# Patient Record
Sex: FEMALE | Race: WHITE | NOT HISPANIC OR LATINO | Employment: UNEMPLOYED | URBAN - METROPOLITAN AREA
[De-identification: names, ages, dates, MRNs, and addresses within clinical notes are randomized per-mention and may not be internally consistent; named-entity substitution may affect disease eponyms.]

---

## 2017-01-23 ENCOUNTER — HOSPITAL ENCOUNTER (OUTPATIENT)
Facility: HOSPITAL | Age: 62
Discharge: HOME | End: 2017-01-24
Attending: SURGERY | Admitting: INTERNAL MEDICINE
Payer: COMMERCIAL

## 2017-01-23 DIAGNOSIS — R41.3 MEMORY LOSS: ICD-10-CM

## 2017-01-23 DIAGNOSIS — G45.4 TRANSIENT GLOBAL AMNESIA: ICD-10-CM

## 2017-01-23 DIAGNOSIS — G45.9 TRANSIENT CEREBRAL ISCHEMIC ATTACK: ICD-10-CM

## 2017-01-23 DIAGNOSIS — R20.0 ARM NUMBNESS LEFT: Primary | ICD-10-CM

## 2017-01-23 LAB
25(OH)D3+25(OH)D2 SERPL-MCNC: 24 NG/ML
ALBUMIN SERPL BCP-MCNC: 4.3 G/DL
ALP SERPL-CCNC: 72 U/L
ALT SERPL W/O P-5'-P-CCNC: 16 U/L
ANION GAP SERPL CALC-SCNC: 10 MMOL/L
APTT BLDCRRT: 24.1 SEC
AST SERPL-CCNC: 16 U/L
BASOPHILS # BLD AUTO: 0.01 K/UL
BASOPHILS NFR BLD: 0.2 %
BILIRUB SERPL-MCNC: 1.1 MG/DL
BNP SERPL-MCNC: 76 PG/ML
BUN SERPL-MCNC: 12 MG/DL
CALCIUM SERPL-MCNC: 9.3 MG/DL
CHLORIDE SERPL-SCNC: 104 MMOL/L
CK MB SERPL-MCNC: 1 NG/ML
CK MB SERPL-RTO: 1.2 %
CK SERPL-CCNC: 85 U/L
CK SERPL-CCNC: 85 U/L
CO2 SERPL-SCNC: 26 MMOL/L
CREAT SERPL-MCNC: 0.8 MG/DL
DIFFERENTIAL METHOD: NORMAL
EOSINOPHIL # BLD AUTO: 0.1 K/UL
EOSINOPHIL NFR BLD: 1.6 %
ERYTHROCYTE [DISTWIDTH] IN BLOOD BY AUTOMATED COUNT: 13.4 %
EST. GFR  (AFRICAN AMERICAN): >60 ML/MIN/1.73 M^2
EST. GFR  (NON AFRICAN AMERICAN): >60 ML/MIN/1.73 M^2
FOLATE SERPL-MCNC: 13.8 NG/ML
GLUCOSE SERPL-MCNC: 118 MG/DL
HCT VFR BLD AUTO: 43.9 %
HGB BLD-MCNC: 15 G/DL
INR PPP: 1
LYMPHOCYTES # BLD AUTO: 1.9 K/UL
LYMPHOCYTES NFR BLD: 33 %
MAGNESIUM SERPL-MCNC: 2.3 MG/DL
MCH RBC QN AUTO: 29 PG
MCHC RBC AUTO-ENTMCNC: 34.2 %
MCV RBC AUTO: 85 FL
MONOCYTES # BLD AUTO: 0.4 K/UL
MONOCYTES NFR BLD: 6.4 %
NEUTROPHILS # BLD AUTO: 3.3 K/UL
NEUTROPHILS NFR BLD: 58.8 %
PHOSPHATE SERPL-MCNC: 2.7 MG/DL
PLATELET # BLD AUTO: 266 K/UL
PMV BLD AUTO: 9.6 FL
POTASSIUM SERPL-SCNC: 4.4 MMOL/L
PROT SERPL-MCNC: 7.6 G/DL
PROTHROMBIN TIME: 9.8 SEC
RBC # BLD AUTO: 5.18 M/UL
SODIUM SERPL-SCNC: 140 MMOL/L
TROPONIN I SERPL DL<=0.01 NG/ML-MCNC: <0.006 NG/ML
TSH SERPL DL<=0.005 MIU/L-ACNC: 3.31 UIU/ML
VIT B12 SERPL-MCNC: 324 PG/ML
WBC # BLD AUTO: 5.63 K/UL

## 2017-01-23 PROCEDURE — 82607 VITAMIN B-12: CPT

## 2017-01-23 PROCEDURE — 25000003 PHARM REV CODE 250: Performed by: SURGERY

## 2017-01-23 PROCEDURE — 85025 COMPLETE CBC W/AUTO DIFF WBC: CPT

## 2017-01-23 PROCEDURE — A9585 GADOBUTROL INJECTION: HCPCS | Performed by: INTERNAL MEDICINE

## 2017-01-23 PROCEDURE — 80053 COMPREHEN METABOLIC PANEL: CPT

## 2017-01-23 PROCEDURE — 27000339 *HC DAILY SUPPLY KIT

## 2017-01-23 PROCEDURE — 25500020 PHARM REV CODE 255: Performed by: INTERNAL MEDICINE

## 2017-01-23 PROCEDURE — 83735 ASSAY OF MAGNESIUM: CPT

## 2017-01-23 PROCEDURE — 36415 COLL VENOUS BLD VENIPUNCTURE: CPT

## 2017-01-23 PROCEDURE — 99215 OFFICE O/P EST HI 40 MIN: CPT | Mod: ,,, | Performed by: PSYCHIATRY & NEUROLOGY

## 2017-01-23 PROCEDURE — 84100 ASSAY OF PHOSPHORUS: CPT

## 2017-01-23 PROCEDURE — 82746 ASSAY OF FOLIC ACID SERUM: CPT

## 2017-01-23 PROCEDURE — 85730 THROMBOPLASTIN TIME PARTIAL: CPT

## 2017-01-23 PROCEDURE — 99284 EMERGENCY DEPT VISIT MOD MDM: CPT | Mod: 25

## 2017-01-23 PROCEDURE — 85610 PROTHROMBIN TIME: CPT

## 2017-01-23 PROCEDURE — 83880 ASSAY OF NATRIURETIC PEPTIDE: CPT

## 2017-01-23 PROCEDURE — G0378 HOSPITAL OBSERVATION PER HR: HCPCS

## 2017-01-23 PROCEDURE — 84484 ASSAY OF TROPONIN QUANT: CPT | Mod: 91

## 2017-01-23 PROCEDURE — 82553 CREATINE MB FRACTION: CPT

## 2017-01-23 PROCEDURE — 99219 PR INITIAL OBSERVATION CARE,LEVL II: CPT | Mod: ,,, | Performed by: INTERNAL MEDICINE

## 2017-01-23 PROCEDURE — 82306 VITAMIN D 25 HYDROXY: CPT

## 2017-01-23 PROCEDURE — 84443 ASSAY THYROID STIM HORMONE: CPT

## 2017-01-23 PROCEDURE — 27200120 HC KIT IV START (RUSH ONLY)

## 2017-01-23 RX ORDER — ONDANSETRON 2 MG/ML
4 INJECTION INTRAMUSCULAR; INTRAVENOUS EVERY 8 HOURS PRN
Status: DISCONTINUED | OUTPATIENT
Start: 2017-01-23 | End: 2017-01-24 | Stop reason: HOSPADM

## 2017-01-23 RX ORDER — GADOBUTROL 604.72 MG/ML
5 INJECTION INTRAVENOUS
Status: COMPLETED | OUTPATIENT
Start: 2017-01-23 | End: 2017-01-23

## 2017-01-23 RX ORDER — ACETAMINOPHEN 325 MG/1
650 TABLET ORAL EVERY 8 HOURS PRN
Status: DISCONTINUED | OUTPATIENT
Start: 2017-01-23 | End: 2017-01-24 | Stop reason: HOSPADM

## 2017-01-23 RX ORDER — PANTOPRAZOLE SODIUM 40 MG/1
40 TABLET, DELAYED RELEASE ORAL DAILY
Status: DISCONTINUED | OUTPATIENT
Start: 2017-01-23 | End: 2017-01-24 | Stop reason: HOSPADM

## 2017-01-23 RX ADMIN — GADOBUTROL 5 ML: 604.72 INJECTION INTRAVENOUS at 02:01

## 2017-01-23 RX ADMIN — PANTOPRAZOLE SODIUM 40 MG: 40 TABLET, DELAYED RELEASE ORAL at 11:01

## 2017-01-23 NOTE — NURSING
Spoke with patient and family. NP spoke with patient. Patient less anxious now. All questions answered.

## 2017-01-23 NOTE — CONSULTS
"[facility]  Neurology  Consult Note    Patient Name: Tammie Sanders  MRN: [id number]  Admission Date: 1/23/2017  Hospital Length of Stay: 0 days  Code Status: Full Code   Attending Provider: Mariela Ross MD   Consulting Provider: Kendall Steel MD  Primary Care Physician: Ignacia Chan MD  Principal Problem:<principal problem not specified>    Inpatient consult to Neurology  Consult performed by: KENDALL STEEL  Consult ordered by: MICHAEL GRECO         Subjective:     Chief Complaint:  AMS. Left arm numbness     HPI:   Tammie Sanders is a 61 y.o. 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 EMG showed Mild bilateral Carpal tunnel syndrome prior and MRI C spine did not show significant process on the left  She had not come for follow up with me in nearly 2 years  She presents today with episode of confusion and left arm numbness after much stress at home.Grandson was being placed in rehab at the time this was occurring.   Patient was last seen normal at 8 am this am. Then, she was confused as to wear she was and what she was doing very suddenly-- asking, "where am I, what are we doing?" to  when she was completely oriented prior. This lasted no more than 30 seconds and resolved  Her left arm is numb but she contributes this to her known CTS and states this is no worse than prior/ states it remains this way  Symptoms resolved prior to ER presentation.        Past Medical History   Diagnosis Date    Migraine headache        History reviewed. No pertinent past surgical history.    Review of patient's allergies indicates:   Allergen Reactions    No known drug allergies            No current facility-administered medications on file prior to encounter.      Current Outpatient Prescriptions on File Prior to Encounter "   Medication Sig    estradiol-levonorgestrel (CLIMARAPRO) 0.045-0.015 mg/24 hr Place 1 patch onto the skin once a week.    sumatriptan (IMITREX) 100 MG tablet Take one daily PRN headache. May repeat dose once after 2 hours    verapamil (CALAN-SR) 120 MG CR tablet Take 1 tablet (120 mg total) by mouth every evening.     Family History     Problem Relation (Age of Onset)    Cancer Father    Heart disease Mother    Migraines Daughter        Social History Main Topics    Smoking status: Former Smoker     Packs/day: 1.00     Years: 25.00     Types: Cigarettes     Quit date: 1/1/1997    Smokeless tobacco: Never Used    Alcohol use No    Drug use: No    Sexual activity: Yes     Partners: Male     Birth control/ protection: Post-menopausal   I have reviewed all of this patient's past medical and surgical histories as well as family and social histories and active allergies and medications as documented in the electronic medical record.    Review of Systems   Constitutional: Negative for fever.   HENT: Negative for nosebleeds.    Eyes: Negative for photophobia.   Respiratory: Negative for chest tightness.    Cardiovascular: Negative for leg swelling.   Gastrointestinal: Negative for blood in stool.   Genitourinary: Negative for hematuria.   Musculoskeletal: Negative for neck pain.   Neurological: Negative for tremors.   Psychiatric/Behavioral: Negative for behavioral problems.     Objective:     Vital Signs (Most Recent):  Temp: 97.6 °F (36.4 °C) (01/23/17 1103)  Pulse: 93 (01/23/17 1103)  Resp: (!) 24 (01/23/17 1103)  BP: (!) 151/86 (01/23/17 1006)  SpO2: 99 % (01/23/17 1006) Vital Signs (24h Range):  Temp:  [97.6 °F (36.4 °C)-98.2 °F (36.8 °C)] 97.6 °F (36.4 °C)  Pulse:  [76-93] 93  Resp:  [18-24] 24  SpO2:  [98 %-99 %] 99 %  BP: (150-180)/(85-92) 151/86     Weight: 56.7 kg (125 lb)  Body mass index is 24.41 kg/(m^2).    Physical Exam    Gen Appearance, well developed/nourished in no apparent distress  CV: 2+  distal pulses with no edema or swelling  Neuro:  MS: Awake, alert, oriented to place, person, time, situation. Sustains attention. Recent/remote memory intact, Language is full to spontaneous speech/repetition/naming/comprehension. Fund of Knowledge is full  CN: Optic discs are flat with normal vasculature, PERRL, Extraoccular movements and visual fields are full. Normal facial sensation and strength, Hearing symmetric, Tongue and Palate are midline and strong. Shoulder Shrug symmetric and strong.  Motor: Normal bulk, tone, no abnormal movements. 5/5 strength bilateral upper/lower extremities with 2+ reflexes and bilateral plantar response  Sensory: symmetric to light touch, pain, temp, and vibration Romberg negative  Cerebellar: Finger-nose,Heal-shin, Rapid alternating movements intact  Gait: Normal stance, no ataxia         Significant Labs:CMP, CBC, reviewed  Troponin normal     Significant Imaging:CT head reports and images reviewed  EKG Pending    Assessment and Plan:   Tammie Sanders is a 61 y.o. 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 Prior EMG showed Mild bilateral Carpal tunnel syndrome prior and MRI C spine did not show significant process on the left  She had not come for follow up with me in nearly 2 years  Here today for 30 minutes of confusion  I recommend:     Arm numbness left  Patient states this is a chronic known complaint (last saw me for this in 2015/ has known CTS    Transient global amnesia  -Differential Diagnosis includes Migraine Aura, Transient global amnesia, and least likely TIA/CVA, Seizure  -MRI of the brain with MRA of the brain both with contrast to rule out vascular cause of symptoms  -Carotid US  -2D echo with bubble study  -Telemetry and Neurochecks every four hours and call me for any changes  -Not a TPA or rehab/therapy candidate-  symptoms resolved and seem not likely vascular  -Check fasting lipids and glucose in the am  -Avoid ASA until MRI better reviewed/ Had pontine lesion prior.   -Check An EEG  If all studies are unremarkable this likely represented either migraine aura or Transient Global Amnesia which is also felt to be a benign condition. Discussed family stress as a risk factor with patient.   -reduce stress to reduce further migraine triggers.     Will follow. Consider D/c home tomorrow if above studies are normal  No restrictions needed otherwise. Follow up with neurology per d/c summary.   Kit Steel MD  Neurology  [facility]

## 2017-01-23 NOTE — IP AVS SNAPSHOT
[address] [address]  Phone: [phone]           Patient Discharge Instructions     Our goal is to set you up for success. This packet includes information on your condition, medications, and your home care. It will help you to care for yourself so you don't get sicker and need to go back to the hospital.     Please ask your nurse if you have any questions.        There are many details to remember when preparing to leave the hospital. Here is what you will need to do:    1. Take your medicine. If you are prescribed medications, review your Medication List in the following pages. You may have new medications to  at the pharmacy and others that you'll need to stop taking. Review the instructions for how and when to take your medications. Talk with your doctor or nurses if you are unsure of what to do.     2. Go to your follow-up appointments. Specific follow-up information is listed in the following pages. Your may be contacted by a transition nurse or clinical provider about future appointments. Be sure we have all of the phone numbers to reach you, if needed. Please contact your provider's office if you are unable to make an appointment.     3. Watch for warning signs. Your doctor or nurse will give you detailed warning signs to watch for and when to call for assistance. These instructions may also include educational information about your condition. If you experience any of warning signs to your health, call your doctor.               [facility]  Unless otherwise directed by your provider, please contact [facility], our nurse care line that is available for 24/7 assistance.     [phone] (toll-free)    Registered nurses in the [facility] provide clinical advisement, health education, appointment booking, and other advisory services.                    ** Verify the list of medication(s) below is accurate and up to date. Carry  this with you in case of emergency. If your medications have changed, please notify your healthcare provider.             Medication List      CONTINUE taking these medications        Additional Info                      estradiol-levonorgestrel 0.045-0.015 mg/24 hr   Commonly known as:  CLIMARAPRO   Quantity:  4 patch   Refills:  11   Dose:  1 patch    Instructions:  Place 1 patch onto the skin once a week.     Begin Date    AM    Noon    PM    Bedtime       sumatriptan 100 MG tablet   Commonly known as:  IMITREX   Quantity:  9 tablet   Refills:  11    Instructions:  Take one daily PRN headache. May repeat dose once after 2 hours     Begin Date    AM    Noon    PM    Bedtime         STOP taking these medications     verapamil 120 MG CR tablet   Commonly known as:  CALAN-SR                  Please bring to all follow up appointments:    1. A copy of your discharge instructions.  2. All medicines you are currently taking in their original bottles.  3. Identification and insurance card.    Please arrive 15 minutes ahead of scheduled appointment time.    Please call 24 hours in advance if you must reschedule your appointment and/or time.        Your Scheduled Appointments     Feb 07, 2017 10:00 AM CST   Established Patient Visit with Nhung Bunn NP   [facility] ([facility]    [address] [facility] [address] [phone]              Follow-up Information     Follow up with [facility] In 2 weeks.    Specialty:  Neurology    Why:  Outpatient Services    Contact information:    [address] [facility] [address] [phone]    Additional information:    Specialty Clinic        Follow up with Ignacia Chan MD In 3 days.    Specialty:  Family Medicine    Why:  Outpatient Services    Contact information:    [address] [address] [address]  [facility] [address] [phone]          Discharge Instructions     Future Orders    Activity as tolerated     Diet general      Questions:    Total calories:      Fat restriction, if any:      Protein restriction, if any:      Na restriction, if any:      Fluid restriction:      Additional restrictions:          Discharge Instructions       Reduce weight and follow a low cholesterol higher protein (lower carb) diet to reduce your cholesterol and blood sugar levels.       Primary Diagnosis     Your primary diagnosis was:  Memory Loss      Admission Information     Date & Time Provider Department CSN    1/23/2017  8:26 AM Mariela Ross MD [facility] [id number]      Care Providers     Provider Role Specialty Primary office phone    Mariela Ross MD Attending Provider Internal Medicine [phone]    Kit Steel MD Consulting Physician  Neurology [phone]      Your Vitals Were     BP Pulse Temp Resp Height Weight    140/77 (BP Location: Right arm, Patient Position: Sitting, BP Method: Automatic) 71 97.4 °F (36.3 °C) (Oral) 20 5' (1.524 m) 56.7 kg (125 lb)    SpO2 BMI             98% 24.41 kg/m2         Recent Lab Values     No lab values to display.      Allergies as of 1/24/2017        Reactions    No Known Drug Allergies       Advance Directives     An advance directive is a document which, in the event you are no longer able to make decisions for yourself, tells your healthcare team what kind of treatment you do or do not want to receive, or who you would like to make those decisions for you.  If you do not currently have an advance directive, [facility] encourages you to create one.  For more information call:  [phone]), [phone] ([phone]),  or log on to [email or web address].        Smoking Cessation     If you would like to quit smoking:   You may be eligible for free services if you are a [place] resident and started smoking cigarettes before September 1, 1988.  Call the Smoking Cessation Trust (SCT) toll free at (291) 869-8951 or (896) 961-0753.   Call  1-800-QUIT-NOW if you do not meet the above criteria.            Language Assistance Services     ATTENTION: Language assistance services are available, free of charge. Please call [phone].      ATENCIÓN: Si bryn munoz, tiene a ritter disposición servicios gratuitos de asistencia lingüística. Llame al [phone].     CHÚ Ý: N?u b?n nói Ti?ng Vi?t, có các d?ch v? h? tr? ngôn ng? mi?n phí dành cho b?n. G?i s? [phone].        [facility] Sign-Up     Activating your [facility] account is as easy as 1-2-3!     1) Visit [email or web address], select Sign Up Now, enter this activation code and your date of birth, then select Next.  Activation code not generated  Current Patient Portal Status: Account disabled      2) Create a username and password to use when you visit [facility] in the future and select a security question in case you lose your password and select Next.    3) Enter your e-mail address and click Sign Up!    Additional Information  If you have questions, please e-mail [email or web address] or call [phone] to talk to our [facility] staff. Remember, [facility] is NOT to be used for urgent needs. For medical emergencies, dial 911.          [facility] complies with applicable Federal civil rights laws and does not discriminate on the basis of race, color, national origin, age, disability, or sex.

## 2017-01-23 NOTE — ED TRIAGE NOTES
Ambulatory to triage.   states his wife walked into [facility] this am and could not remember where they were going today.  Started complaining of left arm numbness.  States stated around 25 minutes ago.  Pt aaoX3  Just can not remember they were bringing grandson to drug rehab.  Arm numbness remains.

## 2017-01-23 NOTE — SUBJECTIVE & OBJECTIVE
Past Medical History   Diagnosis Date    Migraine headache        History reviewed. No pertinent past surgical history.    Review of patient's allergies indicates:   Allergen Reactions    No known drug allergies        No current facility-administered medications on file prior to encounter.      Current Outpatient Prescriptions on File Prior to Encounter   Medication Sig    estradiol-levonorgestrel (CLIMARAPRO) 0.045-0.015 mg/24 hr Place 1 patch onto the skin once a week.    sumatriptan (IMITREX) 100 MG tablet Take one daily PRN headache. May repeat dose once after 2 hours    verapamil (CALAN-SR) 120 MG CR tablet Take 1 tablet (120 mg total) by mouth every evening.     Family History     Problem Relation (Age of Onset)    Cancer Father    Heart disease Mother    Migraines Daughter        Social History Main Topics    Smoking status: Former Smoker     Packs/day: 1.00     Years: 25.00     Types: Cigarettes     Quit date: 1/1/1997    Smokeless tobacco: Never Used    Alcohol use No    Drug use: No    Sexual activity: Yes     Partners: Male     Birth control/ protection: Post-menopausal     Review of Systems   Constitutional: Negative for activity change, fatigue, fever and unexpected weight change.   HENT: Negative for congestion, ear pain, hearing loss, rhinorrhea and sore throat.    Eyes: Negative for pain, redness and visual disturbance.   Respiratory: Negative for cough, shortness of breath and wheezing.    Cardiovascular: Negative for chest pain, palpitations and leg swelling.   Gastrointestinal: Negative for abdominal pain, constipation, diarrhea, nausea and vomiting.   Genitourinary: Negative for dysuria, frequency, pelvic pain and urgency.   Musculoskeletal: Negative for back pain, joint swelling and neck pain.   Skin: Negative for color change, rash and wound.   Neurological: Positive for headaches (chronic). Negative for dizziness, weakness and light-headedness.     Objective:     Vital Signs (Most  Recent):  Temp: 97.6 °F (36.4 °C) (01/23/17 1103)  Pulse: 93 (01/23/17 1103)  Resp: (!) 24 (01/23/17 1103)  BP: (!) 151/86 (01/23/17 1006)  SpO2: 99 % (01/23/17 1006) Vital Signs (24h Range):  Temp:  [97.6 °F (36.4 °C)-98.2 °F (36.8 °C)] 97.6 °F (36.4 °C)  Pulse:  [76-93] 93  Resp:  [18-24] 24  SpO2:  [98 %-99 %] 99 %  BP: (150-180)/(85-92) 151/86     Weight: 56.7 kg (125 lb)  Body mass index is 24.41 kg/(m^2).    Physical Exam   Constitutional: She is oriented to person, place, and time. She appears well-developed and well-nourished. No distress.   HENT:   Head: Normocephalic and atraumatic.   Right Ear: External ear normal.   Left Ear: External ear normal.   Eyes: Conjunctivae and EOM are normal. Pupils are equal, round, and reactive to light. Right eye exhibits no discharge. Left eye exhibits no discharge.   Neck: Neck supple. No tracheal deviation present.   Cardiovascular: Normal rate and regular rhythm.  Exam reveals no gallop and no friction rub.    No murmur heard.  Pulmonary/Chest: Effort normal and breath sounds normal. No respiratory distress. She has no wheezes. She has no rales.   Abdominal: Soft. Bowel sounds are normal. She exhibits no distension. There is no tenderness.   Neurological: She is alert and oriented to person, place, and time. No cranial nerve deficit.   Skin: Skin is warm and dry.   Psychiatric: She has a normal mood and affect. Her behavior is normal.   Nursing note and vitals reviewed.       Significant Labs:   Lab Results   Component Value Date    TSH 3.309 01/23/2017     Lab Results   Component Value Date    WBC 5.63 01/23/2017    HGB 15.0 01/23/2017    HCT 43.9 01/23/2017    MCV 85 01/23/2017     01/23/2017     BMP  Lab Results   Component Value Date     01/23/2017    K 4.4 01/23/2017     01/23/2017    CO2 26 01/23/2017    BUN 12 01/23/2017    CREATININE 0.8 01/23/2017    CALCIUM 9.3 01/23/2017    ANIONGAP 10 01/23/2017    ESTGFRAFRICA >60 01/23/2017    EGFRNONAA  >60 01/23/2017     Lab Results   Component Value Date    ALT 16 01/23/2017    AST 16 01/23/2017    ALKPHOS 72 01/23/2017    BILITOT 1.1 (H) 01/23/2017   Mag and phos WNL  BNP    Recent Labs  Lab 01/23/17  0842   BNP 76     INR 1.0  PT 9.8  PTT 24.1  Folate, b12, vit d pending     Significant Imaging:   CT head   1.  No CT evidence of an acute intracranial abnormality.  2. Mild atrophy and minimal small vessel ischemic changes of the periventricular white matter.    CXR No active lung disease.

## 2017-01-23 NOTE — PLAN OF CARE
Problem: Patient Care Overview  Goal: Plan of Care Review  Outcome: Ongoing (interventions implemented as appropriate)  Fall precautions maintained. Patient instructed to call staff for assistance any time she needs to get up and for all needs. Relaxation encouraged. Patient anxiety has decreased. Patient instructed to let staff know if there is anything we can do to help lessen anxiety. Family at bedside. Patient to have MRI, echo, ultrasound, and blood work today. The plan of care for the day was reviewed with patient and family; they all agree with plan of care.

## 2017-01-23 NOTE — ED NOTES
Pt now remembering they were bringing grandson somewhere in [place].  States grandson is messed up with drugs.  Arm numbness resolving.

## 2017-01-23 NOTE — NURSING
Patient arrived to unit from ER. In bed. Patient having anxiety; fidgeting in bed; elevated BP. NP notified of behavior and HTN. NP will assess patient. Family at bedside. Will continue to monitor.

## 2017-01-23 NOTE — ASSESSMENT & PLAN NOTE
-Differential Diagnosis includes Migraine Aura, Transient global amnesia, and least likely TIA/CVA, Seizure  -MRI of the brain with MRA of the brain both with contrast to rule out vascular cause of symptoms  -Carotid US  -2D echo with bubble study  -Telemetry and Neurochecks every four hours and call me for any changes  -Not a TPA or rehab/therapy candidate- symptoms resolved and seem not likely vascular  -Check fasting lipids and glucose in the am  -Avoid ASA until MRI better reviewed/ Had pontine lesion prior.   -Check An EEG  If all studies are unremarkable this likely represented either migraine aura or Transient Global Amnesia which is also felt to be a benign condition. Discussed family stress as a risk factor with patient.

## 2017-01-23 NOTE — ED PROVIDER NOTES
[facility]                                        January 23, 2017                     Chief Complaint  61 y.o. female with Memory Loss (unable to remember where she was going today)    History of Present Illness  Tammie Sanders presents to the emergency room with brief memory loss & arm numbness  Patient was loading her camper when she lost her memory and did not know where she was  The patient's family states this episode was brief, she regained her memory and 20 minutes  Patient also felt left arm numbness at the time with no obvious motor deficits per her family  Patient presents with no facial droop, no weakness, grossly normal motor exam in the ER  Patient has no history of stroke, has no history of heart disease, is a nonsmoker or ROS  Pt has been under stress due to the fact that her grandson is going to drug rehabilitation  Hypertensive but asymptomatic now, episode started < hour ago and is now resolved    The history is provided by the patient  Past medical history: Migraine headache  Past surgical history  NO KNOWN DRUG ALLERGIES    Review of Systems and Physical Exam     Review of Systems  -- Constitution - no fever, denies fatigue, no weakness, no chills  -- Eyes - no tearing or redness, no visual disturbance  -- Ear, Nose - no tinnitus or earache, no nasal congestion or discharge  -- Mouth,Throat - no sore throat, no toothache, normal voice, normal swallowing  -- Respiratory - denies cough and congestion, no shortness of breath, no CROWLEY  -- Cardiovascular - denies chest pain, no palpitations, denies claudication  -- Gastrointestinal - denies abdominal pain, nausea, vomiting, or diarrhea  -- Musculoskeletal - denies back pain, negative for myalgias and arthralgias   -- Neurological - left arm numbness ×20 minutes and brief memory loss  -- Skin - denies pallor, rash, or changes in skin. no hives or welts noted    Vital Signs  -- BP is 151/86 (abnormal) and her pulse is 80.   -- Her  respiration is 18 and oxygen saturation is 99%.      Physical Exam  -- Nursing note and vitals reviewed  -- Head: Atraumatic. Normocephalic. No obvious abnormality  -- Eyes: Pupils are equal and reactive to light. Normal conjunctiva and lids  -- Cardiac: Normal rate, regular rhythm and normal heart sounds  -- Pulmonary: Normal respiratory effort, breath sounds clear to auscultation  -- Abdominal: Soft, no tenderness. Normal bowel sounds. Normal liver edge  -- Musculoskeletal: Normal range of motion, no effusions. Joints stable   -- Neurological: No focal deficits. Showed good interaction with staff  -- Vascular: Posterior tibial, dorsalis pedis and radial pulses 2+ bilaterally    -- Lymphatics: No cervical or peripheral lymphadenopathy. No edema noted    Emergency Room Course     Treatment and Evaluation  -- The CT of the head performed in the ER today was negative for acute pathology   -- The EKG findings today were without concerning findings from baseline   -- The electrolytes drawn in the ER today were within normal limits   -- The CBC drawn in the ER today was within normal limits   -- The cardiac enzymes were within normal limits   -- The BNP drawn in the ER today were within normal limits   -- The PT, PTT, and INR were within normal limits.    -- The magnesium and phosphorus were within normal limits   -- TSH, vitamin D, B12 and folate are all pending    Diagnosis  -- The primary encounter diagnosis was Arm numbness left.   -- A diagnosis of Memory loss was also pertinent to this visit.    Disposition and Plan  -- Disposition: observation  -- Condition: stable  -- Telemetry monitoring  -- Morning labs  -- SCD hoses  -- Home medications  -- Nausea medication when necessary  -- Pain medication when necessary  -- Hep-Lock IV  -- Routine monitoring  -- Bed rest until otherwise stated  -- Low salt diet  -- Protonix for GERD prophylaxis  -- EKG in the morning  -- Aspirin daily  -- Serial cardiac enzymes  --  Neurology consult  -- Neuro checks  -- US Carotid  -- MRI Brain  -- 2D echo    This note is dictated on Dragon Natural Speaking word recognition program.  There are word recognition mistakes that are occasionally missed on review.           Camacho Wylie MD  01/23/17 1030

## 2017-01-23 NOTE — ED NOTES
Patient placed on continuous cardiac monitor, automatic blood pressure cuff and continuous pulse oximeter. Sinus rhythm noted  Denies cp or SOB

## 2017-01-23 NOTE — SUBJECTIVE & OBJECTIVE
Past Medical History   Diagnosis Date    Migraine headache        History reviewed. No pertinent past surgical history.    Review of patient's allergies indicates:   Allergen Reactions    No known drug allergies            No current facility-administered medications on file prior to encounter.      Current Outpatient Prescriptions on File Prior to Encounter   Medication Sig    estradiol-levonorgestrel (CLIMARAPRO) 0.045-0.015 mg/24 hr Place 1 patch onto the skin once a week.    sumatriptan (IMITREX) 100 MG tablet Take one daily PRN headache. May repeat dose once after 2 hours    verapamil (CALAN-SR) 120 MG CR tablet Take 1 tablet (120 mg total) by mouth every evening.     Family History     Problem Relation (Age of Onset)    Cancer Father    Heart disease Mother    Migraines Daughter        Social History Main Topics    Smoking status: Former Smoker     Packs/day: 1.00     Years: 25.00     Types: Cigarettes     Quit date: 1/1/1997    Smokeless tobacco: Never Used    Alcohol use No    Drug use: No    Sexual activity: Yes     Partners: Male     Birth control/ protection: Post-menopausal   I have reviewed all of this patient's past medical and surgical histories as well as family and social histories and active allergies and medications as documented in the electronic medical record.    Review of Systems   Constitutional: Negative for fever.   HENT: Negative for nosebleeds.    Eyes: Negative for photophobia.   Respiratory: Negative for chest tightness.    Cardiovascular: Negative for leg swelling.   Gastrointestinal: Negative for blood in stool.   Genitourinary: Negative for hematuria.   Musculoskeletal: Negative for neck pain.   Neurological: Negative for tremors.   Psychiatric/Behavioral: Negative for behavioral problems.     Objective:     Vital Signs (Most Recent):  Temp: 97.6 °F (36.4 °C) (01/23/17 1103)  Pulse: 93 (01/23/17 1103)  Resp: (!) 24 (01/23/17 1103)  BP: (!) 151/86 (01/23/17 1006)  SpO2: 99 %  (01/23/17 1006) Vital Signs (24h Range):  Temp:  [97.6 °F (36.4 °C)-98.2 °F (36.8 °C)] 97.6 °F (36.4 °C)  Pulse:  [76-93] 93  Resp:  [18-24] 24  SpO2:  [98 %-99 %] 99 %  BP: (150-180)/(85-92) 151/86     Weight: 56.7 kg (125 lb)  Body mass index is 24.41 kg/(m^2).    Physical Exam    Gen Appearance, well developed/nourished in no apparent distress  CV: 2+ distal pulses with no edema or swelling  Neuro:  MS: Awake, alert, oriented to place, person, time, situation. Sustains attention. Recent/remote memory intact, Language is full to spontaneous speech/repetition/naming/comprehension. Fund of Knowledge is full  CN: Optic discs are flat with normal vasculature, PERRL, Extraoccular movements and visual fields are full. Normal facial sensation and strength, Hearing symmetric, Tongue and Palate are midline and strong. Shoulder Shrug symmetric and strong.  Motor: Normal bulk, tone, no abnormal movements. 5/5 strength bilateral upper/lower extremities with 2+ reflexes and bilateral plantar response  Sensory: symmetric to light touch, pain, temp, and vibration Romberg negative  Cerebellar: Finger-nose,Heal-shin, Rapid alternating movements intact  Gait: Normal stance, no ataxia         Significant Labs:CMP, CBC, reviewed  Troponin normal     Significant Imaging:CT head reports and images reviewed  EKG Pending

## 2017-01-24 ENCOUNTER — DOCUMENTATION ONLY (OUTPATIENT)
Dept: NEUROLOGY | Facility: CLINIC | Age: 62
End: 2017-01-24

## 2017-01-24 VITALS
HEART RATE: 71 BPM | DIASTOLIC BLOOD PRESSURE: 77 MMHG | WEIGHT: 125 LBS | SYSTOLIC BLOOD PRESSURE: 140 MMHG | RESPIRATION RATE: 20 BRPM | OXYGEN SATURATION: 98 % | BODY MASS INDEX: 24.54 KG/M2 | HEIGHT: 60 IN | TEMPERATURE: 97 F

## 2017-01-24 LAB
ALBUMIN SERPL BCP-MCNC: 3.9 G/DL
ALP SERPL-CCNC: 62 U/L
ALT SERPL W/O P-5'-P-CCNC: 14 U/L
ANION GAP SERPL CALC-SCNC: 10 MMOL/L
AST SERPL-CCNC: 14 U/L
BASOPHILS # BLD AUTO: 0.02 K/UL
BASOPHILS NFR BLD: 0.3 %
BILIRUB SERPL-MCNC: 1.3 MG/DL
BUN SERPL-MCNC: 12 MG/DL
CALCIUM SERPL-MCNC: 9 MG/DL
CHLORIDE SERPL-SCNC: 105 MMOL/L
CHOLEST/HDLC SERPL: 4.2 {RATIO}
CO2 SERPL-SCNC: 24 MMOL/L
CREAT SERPL-MCNC: 0.8 MG/DL
DIFFERENTIAL METHOD: NORMAL
EOSINOPHIL # BLD AUTO: 0.1 K/UL
EOSINOPHIL NFR BLD: 1.3 %
ERYTHROCYTE [DISTWIDTH] IN BLOOD BY AUTOMATED COUNT: 13.8 %
EST. GFR  (AFRICAN AMERICAN): >60 ML/MIN/1.73 M^2
EST. GFR  (NON AFRICAN AMERICAN): >60 ML/MIN/1.73 M^2
GLUCOSE SERPL-MCNC: 120 MG/DL
HCT VFR BLD AUTO: 42.3 %
HDL/CHOLESTEROL RATIO: 23.6 %
HDLC SERPL-MCNC: 212 MG/DL
HDLC SERPL-MCNC: 50 MG/DL
HGB BLD-MCNC: 14.5 G/DL
LDLC SERPL CALC-MCNC: 143 MG/DL
LYMPHOCYTES # BLD AUTO: 2 K/UL
LYMPHOCYTES NFR BLD: 26.3 %
MCH RBC QN AUTO: 29.1 PG
MCHC RBC AUTO-ENTMCNC: 34.3 %
MCV RBC AUTO: 85 FL
MONOCYTES # BLD AUTO: 0.5 K/UL
MONOCYTES NFR BLD: 6.1 %
NEUTROPHILS # BLD AUTO: 4.9 K/UL
NEUTROPHILS NFR BLD: 66 %
NONHDLC SERPL-MCNC: 162 MG/DL
PLATELET # BLD AUTO: 267 K/UL
PMV BLD AUTO: 9.6 FL
POTASSIUM SERPL-SCNC: 4.2 MMOL/L
PROT SERPL-MCNC: 7 G/DL
RBC # BLD AUTO: 4.99 M/UL
RETIRED EF AND QEF - SEE NOTES: 65 (ref 55–65)
SODIUM SERPL-SCNC: 139 MMOL/L
TRIGL SERPL-MCNC: 95 MG/DL
TROPONIN I SERPL DL<=0.01 NG/ML-MCNC: <0.006 NG/ML
WBC # BLD AUTO: 7.41 K/UL

## 2017-01-24 PROCEDURE — 95816 EEG AWAKE AND DROWSY: CPT | Mod: 26,,, | Performed by: PSYCHIATRY & NEUROLOGY

## 2017-01-24 PROCEDURE — 85025 COMPLETE CBC W/AUTO DIFF WBC: CPT

## 2017-01-24 PROCEDURE — 84484 ASSAY OF TROPONIN QUANT: CPT

## 2017-01-24 PROCEDURE — 93306 TTE W/DOPPLER COMPLETE: CPT | Mod: 26,,, | Performed by: INTERNAL MEDICINE

## 2017-01-24 PROCEDURE — 36415 COLL VENOUS BLD VENIPUNCTURE: CPT

## 2017-01-24 PROCEDURE — 95819 EEG AWAKE AND ASLEEP: CPT

## 2017-01-24 PROCEDURE — 93005 ELECTROCARDIOGRAM TRACING: CPT

## 2017-01-24 PROCEDURE — 96374 THER/PROPH/DIAG INJ IV PUSH: CPT

## 2017-01-24 PROCEDURE — 99215 OFFICE O/P EST HI 40 MIN: CPT | Mod: ,,, | Performed by: PSYCHIATRY & NEUROLOGY

## 2017-01-24 PROCEDURE — G0378 HOSPITAL OBSERVATION PER HR: HCPCS

## 2017-01-24 PROCEDURE — 80053 COMPREHEN METABOLIC PANEL: CPT

## 2017-01-24 PROCEDURE — 80061 LIPID PANEL: CPT

## 2017-01-24 PROCEDURE — 27000339 *HC DAILY SUPPLY KIT

## 2017-01-24 PROCEDURE — 93010 ELECTROCARDIOGRAM REPORT: CPT | Mod: ,,, | Performed by: INTERNAL MEDICINE

## 2017-01-24 PROCEDURE — 25000003 PHARM REV CODE 250: Performed by: SURGERY

## 2017-01-24 PROCEDURE — 99225 PR SUBSEQUENT OBSERVATION CARE,LEVEL II: CPT | Mod: ,,, | Performed by: INTERNAL MEDICINE

## 2017-01-24 RX ADMIN — PANTOPRAZOLE SODIUM 40 MG: 40 TABLET, DELAYED RELEASE ORAL at 08:01

## 2017-01-24 NOTE — PROGRESS NOTES
"[facility]  Progress Note    Patient Name: Tammie Sanders  MRN: [id number]  Patient Class: OP- Observation   Admission Date: 1/23/2017  Length of Stay: 0 days  Attending Physician: Mariela Ross MD  Primary Care Provider: Ignacia Chan MD        Subjective:     Principal Problem: amnesia    HPI:  Pt presented to ER this am with c/o memory loss.Spent the weekend at the camp normal. Went to bed normal last night. Woke up normal this am.  She had packed a camper last night. They were planning to go to a camp ground close to an outpt rehab for her grandson. Her son is at bedside. He reports that for the last few weeks they have been stressed over another family member that is struggling with addiction. They were planning to go to this facility as an out pt. This am after being up for a couple hours she had no memory of packing the camper or her grandson having an addiction issue. She reports that this am her memory is better. She was completely normal last night and this am, but at some point while packing up she feels like she lost time. Her  reports that she went [place] visit her sister. She was functioning, ambulating. Just spacy per family. Her  reports that when she got back in the camper she started asking strange questions. She asked if they were going camping. Had no memory of her grandson having a prob          Hospital Course:  She was worked up in the ER. CT head negative for acute issues. She also had blood work all wnl. She was placed in obs for r/o CVA/TIA. She is in bed in room this am. She reports that she now remembers everything except for the time from locking her home to when she "came too" in hospital. Doesn't recall ambulating to back of street or coming to hospital. She has no fall. No syncope. No history of this. No loss of bowel or bladder. Neuro exam intact. Anxious and fidgeting     Has hx of headaches treated per Dr" Milvia. She denies headache currently. She reports that these are better and rarely has one.     She did have hormones changed last month. Is having some sweating. No hot flashes    Today she has had her carotid u/s no significant stenosis, MRI which shows no changes from 2012 and normal MRA. She is awaiting EEG and echo this am. Discussed anxiety meds but she would rather defer this to her PCP for now      Interval History: doing well, feels great. Numbness to her left arm is her usual, migraine coming on but again nothing out of her ordinary. AAOx3  Review of Systems   Constitutional: Negative for activity change, fatigue, fever and unexpected weight change.   HENT: Negative for congestion, ear pain, hearing loss, rhinorrhea and sore throat.    Eyes: Negative for pain, redness and visual disturbance.   Respiratory: Negative for cough, shortness of breath and wheezing.    Cardiovascular: Negative for chest pain, palpitations and leg swelling.   Gastrointestinal: Negative for abdominal pain, constipation, diarrhea, nausea and vomiting.   Genitourinary: Negative for dysuria, frequency, pelvic pain and urgency.   Musculoskeletal: Negative for back pain, joint swelling and neck pain.   Skin: Negative for color change, rash and wound.   Neurological: Positive for headaches (chronic). Negative for dizziness, weakness and light-headedness.     Objective:     Vital Signs (Most Recent):  Temp: 97.3 °F (36.3 °C) (01/24/17 0741)  Pulse: 74 (01/24/17 0800)  Resp: 20 (01/24/17 0741)  BP: (!) 145/79 (01/24/17 0741)  SpO2: 97 % (01/24/17 0741) Vital Signs (24h Range):  Temp:  [96.4 °F (35.8 °C)-98.1 °F (36.7 °C)] 97.3 °F (36.3 °C)  Pulse:  [63-93] 74  Resp:  [18-24] 20  SpO2:  [97 %-99 %] 97 %  BP: (115-191)/(60-87) 145/79     Weight: 56.7 kg (125 lb)  Body mass index is 24.41 kg/(m^2).    Physical Exam   Constitutional: She is oriented to person, place, and time. She appears well-developed and well-nourished. No distress.    HENT:   Head: Normocephalic and atraumatic.   Right Ear: External ear normal.   Left Ear: External ear normal.   Eyes: Conjunctivae and EOM are normal. Pupils are equal, round, and reactive to light. Right eye exhibits no discharge. Left eye exhibits no discharge.   Neck: Neck supple. No tracheal deviation present.   Cardiovascular: Normal rate and regular rhythm.  Exam reveals no gallop and no friction rub.    No murmur heard.  Pulmonary/Chest: Effort normal and breath sounds normal. No respiratory distress. She has no wheezes. She has no rales.   Abdominal: Soft. Bowel sounds are normal. She exhibits no distension. There is no tenderness.   Neurological: She is alert and oriented to person, place, and time. No cranial nerve deficit.   Skin: Skin is warm and dry.   Psychiatric: She has a normal mood and affect. Her behavior is normal.   Nursing note and vitals reviewed.       Significant Labs:   Lab Results   Component Value Date    TSH 3.309 01/23/2017     Lab Results   Component Value Date    WBC 7.41 01/24/2017    HGB 14.5 01/24/2017    HCT 42.3 01/24/2017    MCV 85 01/24/2017     01/24/2017     BMP  Lab Results   Component Value Date     01/24/2017    K 4.2 01/24/2017     01/24/2017    CO2 24 01/24/2017    BUN 12 01/24/2017    CREATININE 0.8 01/24/2017    CALCIUM 9.0 01/24/2017    ANIONGAP 10 01/24/2017    ESTGFRAFRICA >60 01/24/2017    EGFRNONAA >60 01/24/2017     Lab Results   Component Value Date    ALT 14 01/24/2017    AST 14 01/24/2017    ALKPHOS 62 01/24/2017    BILITOT 1.3 (H) 01/24/2017   Mag and phos WNL  BNP    Recent Labs  Lab 01/23/17  0842   BNP 76     INR 1.0  PT 9.8  PTT 24.1  Folate, b12, vit d pending     Significant Imaging:   CT head   1.  No CT evidence of an acute intracranial abnormality.  2. Mild atrophy and minimal small vessel ischemic changes of the periventricular white matter.    CXR No active lung disease.    US carotid No ultrasound evidence of hemodynamically  significant stenosis.    MRI brain   1.  No MRI evidence of an acute intracranial abnormality.  Stable, nonspecific 6.5 mm area of enhancement involving the left flor unchanged when compare with the prior study of 12/07/2012.  This may represent a small focal venous angioma or vascular anomaly.  2. Mild atrophy and minimal small vessel ischemic changes of the periventricular white matter.    MRA brain Normal brain MRA.    Assessment/Plan:      Arm numbness left  This seems to be a chronic issues for her with known carpal tunnel issues  No acute management for now      Transient global amnesia  Ct head negative  MRI nothing acute/MRA normal  Dr Steel consulted  Echo and carotid u/s this am  Hopefully home today after these test completed. Will need to discuss SSRI with PCP      VTE Risk Mitigation         Ordered     Medium Risk of VTE  Once      01/23/17 7459          Cleveland Concepcion MD  Department of Hospital Medicine   [facility]

## 2017-01-24 NOTE — SUBJECTIVE & OBJECTIVE
Interval History: doing well, feels great. Numbness to her left arm is her usual, migraine coming on but again nothing out of her ordinary. AAOx3  Review of Systems   Constitutional: Negative for activity change, fatigue, fever and unexpected weight change.   HENT: Negative for congestion, ear pain, hearing loss, rhinorrhea and sore throat.    Eyes: Negative for pain, redness and visual disturbance.   Respiratory: Negative for cough, shortness of breath and wheezing.    Cardiovascular: Negative for chest pain, palpitations and leg swelling.   Gastrointestinal: Negative for abdominal pain, constipation, diarrhea, nausea and vomiting.   Genitourinary: Negative for dysuria, frequency, pelvic pain and urgency.   Musculoskeletal: Negative for back pain, joint swelling and neck pain.   Skin: Negative for color change, rash and wound.   Neurological: Positive for headaches (chronic). Negative for dizziness, weakness and light-headedness.     Objective:     Vital Signs (Most Recent):  Temp: 97.3 °F (36.3 °C) (01/24/17 0741)  Pulse: 74 (01/24/17 0800)  Resp: 20 (01/24/17 0741)  BP: (!) 145/79 (01/24/17 0741)  SpO2: 97 % (01/24/17 0741) Vital Signs (24h Range):  Temp:  [96.4 °F (35.8 °C)-98.1 °F (36.7 °C)] 97.3 °F (36.3 °C)  Pulse:  [63-93] 74  Resp:  [18-24] 20  SpO2:  [97 %-99 %] 97 %  BP: (115-191)/(60-87) 145/79     Weight: 56.7 kg (125 lb)  Body mass index is 24.41 kg/(m^2).    Physical Exam   Constitutional: She is oriented to person, place, and time. She appears well-developed and well-nourished. No distress.   HENT:   Head: Normocephalic and atraumatic.   Right Ear: External ear normal.   Left Ear: External ear normal.   Eyes: Conjunctivae and EOM are normal. Pupils are equal, round, and reactive to light. Right eye exhibits no discharge. Left eye exhibits no discharge.   Neck: Neck supple. No tracheal deviation present.   Cardiovascular: Normal rate and regular rhythm.  Exam reveals no gallop and no friction rub.     No murmur heard.  Pulmonary/Chest: Effort normal and breath sounds normal. No respiratory distress. She has no wheezes. She has no rales.   Abdominal: Soft. Bowel sounds are normal. She exhibits no distension. There is no tenderness.   Neurological: She is alert and oriented to person, place, and time. No cranial nerve deficit.   Skin: Skin is warm and dry.   Psychiatric: She has a normal mood and affect. Her behavior is normal.   Nursing note and vitals reviewed.       Significant Labs:   Lab Results   Component Value Date    TSH 3.309 01/23/2017     Lab Results   Component Value Date    WBC 7.41 01/24/2017    HGB 14.5 01/24/2017    HCT 42.3 01/24/2017    MCV 85 01/24/2017     01/24/2017     BMP  Lab Results   Component Value Date     01/24/2017    K 4.2 01/24/2017     01/24/2017    CO2 24 01/24/2017    BUN 12 01/24/2017    CREATININE 0.8 01/24/2017    CALCIUM 9.0 01/24/2017    ANIONGAP 10 01/24/2017    ESTGFRAFRICA >60 01/24/2017    EGFRNONAA >60 01/24/2017     Lab Results   Component Value Date    ALT 14 01/24/2017    AST 14 01/24/2017    ALKPHOS 62 01/24/2017    BILITOT 1.3 (H) 01/24/2017   Mag and phos WNL  BNP    Recent Labs  Lab 01/23/17  0842   BNP 76     INR 1.0  PT 9.8  PTT 24.1  Folate, b12, vit d pending     Significant Imaging:   CT head   1.  No CT evidence of an acute intracranial abnormality.  2. Mild atrophy and minimal small vessel ischemic changes of the periventricular white matter.    CXR No active lung disease.    US carotid No ultrasound evidence of hemodynamically significant stenosis.    MRI brain   1.  No MRI evidence of an acute intracranial abnormality.  Stable, nonspecific 6.5 mm area of enhancement involving the left flor unchanged when compare with the prior study of 12/07/2012.  This may represent a small focal venous angioma or vascular anomaly.  2. Mild atrophy and minimal small vessel ischemic changes of the periventricular white matter.    MRA brain Normal  brain MRA.

## 2017-01-24 NOTE — PROCEDURES
EEG,w/awake & drowsy record  Date/Time: 2017 5:54 PM  Performed by: KIT KING  Authorized by: KIT KING       EEG REPORT      Patients Name: Tammie Sanders  Date of Recordin17  Technician: Maite  Patient :55    Referring Physician: Dr. King  Reason for Exam: Spell  Notable medications: None significant      INTRODUCTION:  This EEG was  recorded using 10-channels and the International 10/20 electrode placement system.  The patient was not sleep deprived.    FINDINGS:  This EEG was recording during wakefulness and drowsiness only.  A 10 Hz posterior dominant rhythm was persistently observed and of normal amplitude and symmetry.   Mild muscle artifact, Eye Blinks, Beta frequency artifact occurred throughout the recording with some increased beta frequency artifact possibly from benzodiazepine use.   Focal slowing was not observed.   Epileptiform activity was not observed    Hyperventilation: Was not Performed    Photic Stimulation: Was not Performed    Clinical Impression:  Normal awake and  Drowsy EEG.      Kit King MD

## 2017-01-24 NOTE — PLAN OF CARE
Problem: Patient Care Overview  Goal: Plan of Care Review  Outcome: Ongoing (interventions implemented as appropriate)  Pt & family aware of plan of care. No questions or concerns at this time.    Problem: Fall Risk (Adult)  Goal: Absence of Falls  Patient will demonstrate the desired outcomes by discharge/transition of care.   Outcome: Ongoing (interventions implemented as appropriate)  Pt free of falls/incidents. Fall precautions maintained.

## 2017-01-24 NOTE — PROGRESS NOTES
[facility]  Neurology  Consult Note     Patient Name: Tammie Sanders  MRN: [id number]  Admission Date: 1/23/2017  Hospital Length of Stay: 1 days  Code Status: Full Code   Attending Provider: Mariela Ross MD   Consulting Provider: Kendall Steel MD  Primary Care Physician: Ignacia Chan MD  Principal Problem:TGA     Inpatient consult to Neurology  Consult performed by: KENDALL STEEL  Consult ordered by: MICHAEL GRECO        Subjective:      Chief Complaint:  AMS. Left arm numbness      HPI:   Tammie Sanders is a 61 y.o. 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 EMG showed Mild bilateral Carpal tunnel syndrome prior and MRI C spine did not show significant process on the left  Here for episode of confusion    INTERVAL HISTORY: no further spells of confusion  Left arm numbness only episodic and long standing  Patient is at her baseline              Past Medical History   Diagnosis Date    Migraine headache           History reviewed. No pertinent past surgical history.          Review of patient's allergies indicates:   Allergen Reactions    No known drug allergies                 No current facility-administered medications on file prior to encounter.            Current Outpatient Prescriptions on File Prior to Encounter   Medication Sig    estradiol-levonorgestrel (CLIMARAPRO) 0.045-0.015 mg/24 hr Place 1 patch onto the skin once a week.    sumatriptan (IMITREX) 100 MG tablet Take one daily PRN headache. May repeat dose once after 2 hours    verapamil (CALAN-SR) 120 MG CR tablet Take 1 tablet (120 mg total) by mouth every evening.      Family History     Problem Relation (Age of Onset)     Cancer Father     Heart disease Mother     Migraines Daughter               Social History Main Topics    Smoking status: Former  Smoker       Packs/day: 1.00       Years: 25.00       Types: Cigarettes       Quit date: 1/1/1997    Smokeless tobacco: Never Used    Alcohol use No    Drug use: No    Sexual activity: Yes       Partners: Male       Birth control/ protection: Post-menopausal   I have reviewed all of this patient's past medical and surgical histories as well as family and social histories and active allergies and medications as documented in the electronic medical record.     Review of Systems   Constitutional: Negative for fever.   HENT: Negative for nosebleeds.   Eyes: Negative for photophobia.   Respiratory: Negative for chest tightness.   Cardiovascular: Negative for leg swelling.   Gastrointestinal: Negative for blood in stool.   Genitourinary: Negative for hematuria.   Musculoskeletal: Negative for neck pain.   Neurological: Negative for tremors.   Psychiatric/Behavioral: Negative for behavioral problems.      Objective:      Vitals:    01/24/17 0741 01/24/17 0800 01/24/17 1000 01/24/17 1130   BP: (!) 145/79   (!) 140/77   BP Location: Right arm   Right arm   Patient Position: Lying   Sitting   BP Method: Automatic   Automatic   Pulse: 67 74 86 71   Resp: 20   20   Temp: 97.3 °F (36.3 °C)   97.4 °F (36.3 °C)   TempSrc: Oral   Oral   SpO2: 97%   98%   Weight:       Height:                Weight: 56.7 kg (125 lb)  Body mass index is 24.41 kg/(m^2).     Physical Exam    Gen Appearance, well developed/nourished in no apparent distress  CV: 2+ distal pulses with no edema or swelling  Neuro:  MS: Awake, alert,. Sustains attention. Recent/remote memory intact, Language is full to spontaneous speech/comprehension. Fund of Knowledge is full  CN: Optic discs are flat with normal vasculature, PERRL, Extraoccular movements and visual fields are full. Normal facial sensation and strength, Hearing symmetric, Tongue and Palate are midline and strong. Shoulder Shrug symmetric and strong.  Motor: Normal bulk, tone, no abnormal movements.  5/5 strength bilateral upper/lower extremities with 2+ reflexes and bilateral plantar response  Sensory: symmetric to light touch, pain, temp, and vibration Romberg negative  Cerebellar: Finger-nose,Heal-shin, Rapid alternating movements intact  Gait: Normal stance, no ataxia        Significant Labs:  Glucose 120       Significant Imaging:CT head reports and images reviewed  EKG Pending    Carotid: No significant stenosis    MRI brain: 1.  No MRI evidence of an acute intracranial abnormality.  Stable, nonspecific 6.5 mm area of enhancement involving the left flor unchanged when compare with the prior study of 12/07/2012.  This may represent a small focal venous angioma or vascular anomaly.  2. Mild atrophy and minimal small vessel ischemic changes of the periventricular white matter.    MRA Brain: Normal brain MRA.      Echo:     CONCLUSIONS     1 - Normal left ventricular systolic function (EF 60-65%).     2 - Normal right ventricular systolic function .     3 - No evidence of intracardiac shunt.      EEG pending  Assessment and Plan:   Tammie Sanders is a 61 y.o. 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 Prior EMG showed Mild bilateral Carpal tunnel syndrome prior and MRI C spine did not show significant process on the left  She had not come for follow up with me in nearly 2 years  Here  for 30 minutes of confusion yesterday  I recommend:      Arm numbness left  Patient states this is a chronic known complaint (last saw me for this in 2015/ has known CTS. No further work up needed     Transient global amnesia  -Felt to be TGA, a benign condition, but will follow up EEG  -Patient aware of impaired fasting glucose and need for diet to reduce this and cholesterol for best CVA prevention  -Can aoid ASA/ has pontine lesion which is stable from prior and not believed  to have TIA/CVA  -reduce stress to reduce further migraine triggers.      No restrictions needed otherwise. Follow up with neurology per d/c summary.   Kit Steel MD  Neurology  [facility]

## 2017-01-24 NOTE — ASSESSMENT & PLAN NOTE
This seems to be a chronic issues for her with known carpal tunnel issues  No acute management for now

## 2017-01-24 NOTE — DISCHARGE INSTRUCTIONS
Reduce weight and follow a low cholesterol higher protein (lower carb) diet to reduce your cholesterol and blood sugar levels.

## 2017-01-24 NOTE — ASSESSMENT & PLAN NOTE
Ct head negative  MRI nothing acute/MRA normal  Dr Steel consulted  Echo and EEG this am  carotid u/s  D/c this afternoon after echo and EEG  F/u neurology in 2 weeks   SSRI with PCP

## 2017-01-24 NOTE — NURSING
Report received. Patient resting in bed.  at bedside. Call bell within reach. Patient instructed to call with needs. Room clean and tidy.

## 2017-01-24 NOTE — H&P
[facility] Hospital Medicine  History & Physical    Patient Name: Tammie Sanders  MRN: [id number]  Admission Date: 1/23/2017  Attending Physician: Mariela Ross MD   Primary Care Provider: Ignacia Chan MD         Patient information was obtained from patient and ER records.     Subjective:     Principal Problem:<principal problem not specified>    Chief Complaint:  Loss of memory     HPI: Pt presented to ER this am with c/o memory loss.Spent the weekend at the camp normal. Went to bed normal last night. Woke up normal this am.  She had packed a camper last night. They were planning to go to a camp ground close to an outpt rehab for her grandson. Her son is at bedside. He reports that for the last few weeks they have been stressed over another family member that is struggling with addiction. They were planning to go to this facility as an out pt. This am after being up for a couple hours she had no memory of packing the camper or her grandson having an addiction issue. She reports that this am her memory is better. She was completely normal last night and this am, but at some point while packing up she feels like she lost time. Her  reports that she went to [place] to visit her sister. She was functioning, ambulating. Just spacy per family. Her  reports that when she got back in the camper she started asking strange questions. She asked if they were going camping. Had no memory of her grandson having a prob          Past Medical History   Diagnosis Date    Migraine headache        History reviewed. No pertinent past surgical history.    Review of patient's allergies indicates:   Allergen Reactions    No known drug allergies        No current facility-administered medications on file prior to encounter.      Current Outpatient Prescriptions on File Prior to Encounter   Medication Sig    estradiol-levonorgestrel (CLIMARAPRO) 0.045-0.015 mg/24 hr Place 1 patch onto  the skin once a week.    sumatriptan (IMITREX) 100 MG tablet Take one daily PRN headache. May repeat dose once after 2 hours    verapamil (CALAN-SR) 120 MG CR tablet Take 1 tablet (120 mg total) by mouth every evening.     Family History     Problem Relation (Age of Onset)    Cancer Father    Heart disease Mother    Migraines Daughter        Social History Main Topics    Smoking status: Former Smoker     Packs/day: 1.00     Years: 25.00     Types: Cigarettes     Quit date: 1/1/1997    Smokeless tobacco: Never Used    Alcohol use No    Drug use: No    Sexual activity: Yes     Partners: Male     Birth control/ protection: Post-menopausal     Review of Systems   Constitutional: Negative for activity change, fatigue, fever and unexpected weight change.   HENT: Negative for congestion, ear pain, hearing loss, rhinorrhea and sore throat.    Eyes: Negative for pain, redness and visual disturbance.   Respiratory: Negative for cough, shortness of breath and wheezing.    Cardiovascular: Negative for chest pain, palpitations and leg swelling.   Gastrointestinal: Negative for abdominal pain, constipation, diarrhea, nausea and vomiting.   Genitourinary: Negative for dysuria, frequency, pelvic pain and urgency.   Musculoskeletal: Negative for back pain, joint swelling and neck pain.   Skin: Negative for color change, rash and wound.   Neurological: Positive for headaches (chronic). Negative for dizziness, weakness and light-headedness.     Objective:     Vital Signs (Most Recent):  Temp: 97.6 °F (36.4 °C) (01/23/17 1103)  Pulse: 93 (01/23/17 1103)  Resp: (!) 24 (01/23/17 1103)  BP: (!) 151/86 (01/23/17 1006)  SpO2: 99 % (01/23/17 1006) Vital Signs (24h Range):  Temp:  [97.6 °F (36.4 °C)-98.2 °F (36.8 °C)] 97.6 °F (36.4 °C)  Pulse:  [76-93] 93  Resp:  [18-24] 24  SpO2:  [98 %-99 %] 99 %  BP: (150-180)/(85-92) 151/86     Weight: 56.7 kg (125 lb)  Body mass index is 24.41 kg/(m^2).    Physical Exam   Constitutional: She is  oriented to person, place, and time. She appears well-developed and well-nourished. No distress.   HENT:   Head: Normocephalic and atraumatic.   Right Ear: External ear normal.   Left Ear: External ear normal.   Eyes: Conjunctivae and EOM are normal. Pupils are equal, round, and reactive to light. Right eye exhibits no discharge. Left eye exhibits no discharge.   Neck: Neck supple. No tracheal deviation present.   Cardiovascular: Normal rate and regular rhythm.  Exam reveals no gallop and no friction rub.    No murmur heard.  Pulmonary/Chest: Effort normal and breath sounds normal. No respiratory distress. She has no wheezes. She has no rales.   Abdominal: Soft. Bowel sounds are normal. She exhibits no distension. There is no tenderness.   Neurological: She is alert and oriented to person, place, and time. No cranial nerve deficit.   Skin: Skin is warm and dry.   Psychiatric: She has a normal mood and affect. Her behavior is normal.   Nursing note and vitals reviewed.       Significant Labs:   Lab Results   Component Value Date    TSH 3.309 01/23/2017     Lab Results   Component Value Date    WBC 5.63 01/23/2017    HGB 15.0 01/23/2017    HCT 43.9 01/23/2017    MCV 85 01/23/2017     01/23/2017     BMP  Lab Results   Component Value Date     01/23/2017    K 4.4 01/23/2017     01/23/2017    CO2 26 01/23/2017    BUN 12 01/23/2017    CREATININE 0.8 01/23/2017    CALCIUM 9.3 01/23/2017    ANIONGAP 10 01/23/2017    ESTGFRAFRICA >60 01/23/2017    EGFRNONAA >60 01/23/2017     Lab Results   Component Value Date    ALT 16 01/23/2017    AST 16 01/23/2017    ALKPHOS 72 01/23/2017    BILITOT 1.1 (H) 01/23/2017   Mag and phos WNL  BNP    Recent Labs  Lab 01/23/17  0842   BNP 76     INR 1.0  PT 9.8  PTT 24.1  Folate, b12, vit d pending     Significant Imaging:   CT head   1.  No CT evidence of an acute intracranial abnormality.  2. Mild atrophy and minimal small vessel ischemic changes of the periventricular  white matter.    CXR No active lung disease.    Assessment/Plan:     Arm numbness left  This seems to be a chronic issues for her with known carpal tunnel issues  No acute management for now      Transient global amnesia  Ct head negative  MRI/MRA this am  Dr Steel consulted  Echo and carotid u/s this am      VTE Risk Mitigation         Ordered     Medium Risk of VTE  Once      01/23/17 1052        Mariela Ross MD  Department of Hospital Medicine   [facility]

## 2017-01-24 NOTE — ASSESSMENT & PLAN NOTE
Ct head negative  MRI nothing acute/MRA normal  Dr Steel consulted  Echo and carotid u/s this am  Hopefully home today after these test completed. Will need to discuss SSRI with PCP

## 2017-01-24 NOTE — PLAN OF CARE
01/24/17 1056   Final Note   Assessment Type Final Discharge Note   Discharge Disposition Home   Discharge planning education complete? Yes   Hospital Follow Up  Appt(s) scheduled? Yes   Discharge plans and expectations educations in teach back method with documentation complete? Yes   Offered [facility] -- Bedside Delivery? n/a   Discharge/Hospital Encounter Summary to (non-[facility]) PCP n/a   Referral to Outpatient Case Management complete? n/a   Referral to / orders for Home Health Complete? n/a   30 day supply of medicines given at discharge, if documented non-compliance / non-adherence? n/a   Any social issues identified prior to discharge? n/a   Did you assess the readiness or willingness of the family or caregiver to support self management of care? n/a   Right Care Referral Info   Post Acute Recommendation No Care

## 2017-01-24 NOTE — DISCHARGE SUMMARY
"[facility] Hospital Medicine  Discharge Summary      Patient Name: Tammie Sanders  MRN: [id number]  Admission Date: 1/23/2017  Hospital Length of Stay: 0 days  Discharge Date and Time: 1/24/2017 9:56 AM  Attending Physician: Mariela Ross MD   Discharging Provider: Renetta Arroyo NP  Primary Care Provider: Ignacia Chan MD      HPI:   Pt presented to ER this am with c/o memory loss.Spent the weekend at the camp normal. Went to bed normal last night. Woke up normal this am.  She had packed a camper last night. They were planning to go to a camp ground close to an outpt rehab for her grandson. Her son is at bedside. He reports that for the last few weeks they have been stressed over another family member that is struggling with addiction. They were planning to go to this facility as an out pt. This am after being up for a couple hours she had no memory of packing the camper or her grandson having an addiction issue. She reports that this am her memory is better. She was completely normal last night and this am, but at some point while packing up she feels like she lost time. Her  reports that [place] her [place]. She was functioning, ambulating. Just spacy per family. Her  reports that when she got back in the camper she started asking strange questions. She asked if they were going camping. Had no memory of her grandson having a prob          * No surgery found *      Indwelling Lines/Drains at time of discharge:   Lines/Drains/Airways          No matching active lines, drains, or airways        Hospital Course:   She was worked up in the ER. CT head negative for acute issues. She also had blood work all wnl. She was placed in obs for r/o CVA/TIA. She is in bed in room this am. She reports that she now remembers everything except for the time from locking her home to when she "came too" in hospital. Doesn't recall ambulating to back of street or " coming to hospital. She has no fall. No syncope. No history of this. No loss of bowel or bladder. Neuro exam intact. Anxious and fidgeting     Has hx of headaches treated per Dr Steel. She denies headache currently. She reports that these are better and rarely has one.     She did have hormones changed last month. Is having some sweating. No hot flashes    Today she has had her carotid u/s no significant stenosis, MRI which shows no changes from 2012 and normal MRA. She is awaiting EEG and echo this am. Discussed anxiety meds but she would rather defer this to her PCP for now       Consults:   Consults         Status Ordering Provider     Inpatient consult to Neurology  Once     Provider:  Kit Steel MD    Completed MICHAEL GRECO          Significant Diagnostic Studies:   Lab Results   Component Value Date     TSH 3.309 01/23/2017            Lab Results   Component Value Date     WBC 7.41 01/24/2017     HGB 14.5 01/24/2017     HCT 42.3 01/24/2017     MCV 85 01/24/2017      01/24/2017      BMP        Lab Results   Component Value Date      01/24/2017     K 4.2 01/24/2017      01/24/2017     CO2 24 01/24/2017     BUN 12 01/24/2017     CREATININE 0.8 01/24/2017     CALCIUM 9.0 01/24/2017     ANIONGAP 10 01/24/2017     ESTGFRAFRICA >60 01/24/2017     EGFRNONAA >60 01/24/2017            Lab Results   Component Value Date     ALT 14 01/24/2017     AST 14 01/24/2017     ALKPHOS 62 01/24/2017     BILITOT 1.3 (H) 01/24/2017   Mag and phos WNL  BNP     Recent Labs  Lab 01/23/17  0842   BNP 76      INR 1.0  PT 9.8  PTT 24.1  Folate, b12, vit d pending      Significant Imaging:   CT head   1.  No CT evidence of an acute intracranial abnormality.  2. Mild atrophy and minimal small vessel ischemic changes of the periventricular white matter.    CXR No active lung disease.     US carotid No ultrasound evidence of hemodynamically significant stenosis.     MRI brain   1.  No MRI evidence of an  acute intracranial abnormality.  Stable, nonspecific 6.5 mm area of enhancement involving the left flor unchanged when compare with the prior study of 12/07/2012.  This may represent a small focal venous angioma or vascular anomaly.  2. Mild atrophy and minimal small vessel ischemic changes of the periventricular white matter.     MRA brain Normal brain MRA.        Pending Diagnostic Studies:     None        Final Active Diagnoses:    Diagnosis Date Noted POA    PRINCIPAL PROBLEM:  Transient global amnesia [G45.4] 01/23/2017 Yes    Arm numbness left [R20.0] 01/23/2017 Yes      Problems Resolved During this Admission:    Diagnosis Date Noted Date Resolved POA      * Transient global amnesia  Ct head negative  MRI nothing acute/MRA normal  Dr Steel consulted  Echo and EEG this am  carotid u/s  D/c this afternoon after echo and EEG  F/u neurology in 2 weeks   SSRI with PCP      Arm numbness left  This seems to be a chronic issues for her with known carpal tunnel issues  No acute management for now        Discharged Condition: good    Disposition: Home or Self Care    Follow Up:  Follow-up Information     Follow up with [facility] In 2 weeks.    Specialty:  Neurology    Why:  Outpatient Services    Contact information:    [address] [facility] [address] [phone]    Additional information:    Specialty Clinic        Follow up with Ignacia Chan MD In 3 days.    Specialty:  Family Medicine    Contact information:    [address]  [place] [address] [phone]          Patient Instructions:     Diet general     Activity as tolerated       Medications:  Reconciled Home Medications:   Current Discharge Medication List      CONTINUE these medications which have NOT CHANGED    Details   estradiol-levonorgestrel (CLIMARAPRO) 0.045-0.015 mg/24 hr Place 1 patch onto the skin once a week.  Qty: 4 patch, Refills: 11      sumatriptan (IMITREX) 100 MG tablet Take one daily PRN  headache. May repeat dose once after 2 hours  Qty: 9 tablet, Refills: 11         STOP taking these medications       verapamil (CALAN-SR) 120 MG CR tablet Comments:   Reason for Stopping:             Time spent on the discharge of patient: 20 minutes    Renetta Arroyo NP  Department of Hospital Medicine  [facility]

## 2017-01-24 NOTE — PLAN OF CARE
Problem: Patient Care Overview  Goal: Plan of Care Review  Outcome: Ongoing (interventions implemented as appropriate)  Pt rested well this shift.  No complaints of pain noted.  VS stable.  Pt agrees with plan of care.  Family at bedside.  Call bell in reach.  Will continue to monitor.

## 2017-01-25 NOTE — PROGRESS NOTES
EEG REPORT      Patients Name: Tammie Sanders  Date of Recordin17  Technician: Maite  Patient :55    Referring Physician: Dr. Steel  Reason for Exam: Spell  Notable medications: None significant      INTRODUCTION:  This EEG was  recorded using 10-channels and the International 10/20 electrode placement system.  The patient was not sleep deprived.    FINDINGS:  This EEG was recording during wakefulness and drowsiness only.  A 10 Hz posterior dominant rhythm was persistently observed and of normal amplitude and symmetry.   Mild muscle artifact, Eye Blinks, Beta frequency artifact occurred throughout the recording with some increased beta frequency artifact possibly from benzodiazepine use.   Focal slowing was not observed.   Epileptiform activity was not observed    Hyperventilation: Was not Performed    Photic Stimulation: Was not Performed    Clinical Impression:  Normal awake and  Drowsy EEG.      Kit Steel MD

## 2017-02-14 ENCOUNTER — OFFICE VISIT (OUTPATIENT)
Dept: NEUROLOGY | Facility: CLINIC | Age: 62
End: 2017-02-14
Payer: COMMERCIAL

## 2017-02-14 VITALS
SYSTOLIC BLOOD PRESSURE: 142 MMHG | DIASTOLIC BLOOD PRESSURE: 90 MMHG | HEART RATE: 60 BPM | HEIGHT: 60 IN | WEIGHT: 128 LBS | BODY MASS INDEX: 25.13 KG/M2 | RESPIRATION RATE: 16 BRPM

## 2017-02-14 DIAGNOSIS — Q28.3 CONGENITAL ANOMALY OF CEREBROVASCULAR SYSTEM: ICD-10-CM

## 2017-02-14 DIAGNOSIS — Z78.0 POSTMENOPAUSAL STATE: ICD-10-CM

## 2017-02-14 DIAGNOSIS — E55.9 VITAMIN D DEFICIENCY: ICD-10-CM

## 2017-02-14 DIAGNOSIS — R20.0 ARM NUMBNESS LEFT: ICD-10-CM

## 2017-02-14 DIAGNOSIS — G45.4 TRANSIENT GLOBAL AMNESIA: Primary | ICD-10-CM

## 2017-02-14 PROCEDURE — 99214 OFFICE O/P EST MOD 30 MIN: CPT | Mod: S$GLB,,, | Performed by: NURSE PRACTITIONER

## 2017-02-14 PROCEDURE — 99999 PR PBB SHADOW E&M-EST. PATIENT-LVL III: CPT | Mod: PBBFAC,,, | Performed by: NURSE PRACTITIONER

## 2017-02-14 NOTE — MR AVS SNAPSHOT
[facility]  [address] [facility] [address]  Phone: [phone]  Fax: [phone]                  Tammie Sanders   2017 10:40 AM   Office Visit    Description:  Female : 1955   Provider:  Nhung Bunn NP   Department:  [facility]           Reason for Visit     Memory Loss           Diagnoses this Visit        Comments    Vitamin D deficiency                To Do List           Goals (5 Years of Data)     None      Follow-Up and Disposition     Return in about 6 weeks (around 3/28/2017).      [facility]     [facility] -  Assistance  Registered nurses in the [facility] provide clinical advisement, health education, appointment booking, and other advisory services.  Call for this free service at [phone].             Medications           Message regarding Medications     Verify the changes and/or additions to your medication regime listed below are the same as discussed with your clinician today.  If any of these changes or additions are incorrect, please notify your healthcare provider.             Verify that the below list of medications is an accurate representation of the medications you are currently taking.  If none reported, the list may be blank. If incorrect, please contact your healthcare provider. Carry this list with you in case of emergency.           Current Medications     estradiol-levonorgestrel (CLIMARAPRO) 0.045-0.015 mg/24 hr Place 1 patch onto the skin once a week.    sumatriptan (IMITREX) 100 MG tablet Take one daily PRN headache. May repeat dose once after 2 hours           Clinical Reference Information           Your Vitals Were     BP Pulse Resp Height Weight BMI    142/90 (BP Location: Right arm, Patient Position: Sitting, BP Method: Manual) 60 16 5' (1.524 m) 58 kg (127 lb 15.6 oz) 24.99 kg/m2      Blood Pressure          Most Recent Value    BP  (!)  142/90       Allergies as of 2/14/2017     No Known Drug Allergies      Immunizations Administered on Date of Encounter - 2/14/2017     None      [facility] Sign-Up     Activating your [facility] account is as easy as 1-2-3!     1) Visit [email or web address], select Sign Up Now, enter this activation code and your date of birth, then select Next.  Activation code not generated  Current Patient Portal Status: Account disabled      2) Create a username and password to use when you visit [facility] in the future and select a security question in case you lose your password and select Next.    3) Enter your e-mail address and click Sign Up!    Additional Information  If you have questions, please e-mail [email or web address] or call [phone] to talk to our [facility] staff. Remember, [employer] is NOT to be used for urgent needs. For medical emergencies, dial 911.         Instructions    Start Vitamin D3 5000IU over the counter 1 by mouth each day for Vitamin D deficiency.          Language Assistance Services     ATTENTION: Language assistance services are available, free of charge. Please call [phone].      ATENCIÓN: Si habla español, tiene a ritter disposición servicios gratuitos de asistencia lingüística. Llame al [phone].     CHÚ Ý: N?u b?n nói Ti?ng Vi?t, có các d?ch v? h? tr? ngôn ng? mi?n phí dành cho b?n. G?i s? [phone].         [facility] complies with applicable Federal civil rights laws and does not discriminate on the basis of race, color, national origin, age, disability, or sex.

## 2017-02-14 NOTE — PROGRESS NOTES
"HPI: Tammie Sanders is a 59 y.o. 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 teleangiectasia, with no treatment needed per neurosurgery, mild bilateral CTS.    She presents today for a hospital follow up after an admission on 1/23/2017 for complaint of 30 minute of memory loss. Her workup was overall unrevealing for TIA or seizures. She denies any further TAA episodes, but states that she continues to feel "fuzzy"; however, this feeling predated her admission for memory loss. Her hormone replacement patch was changed two months ago, and she believes that this could be contributing. Her patch will be changed back to her prior patch this week.     She also complains of having 1-2 headaches per week since switching her hormone replacement patch. Her headaches are located mostly to the left parietal area, are mild to moderate in severity, are throbbing in nature, and are easily aborted with Imitrex, which she takes sparingly. She does have occasional nausea and neck pain. She denies any photophobia, phonophobia, autonomic complaints, speech difficulty, focal weakness, and paresthesias with her migraines. Her headaches are triggered by certain aromas. Her headaches are helped with M-graine essential oil blend at times.     She continues to have transient LUE paresthesias; however, this is long standing. Her CTS is stable. She denies dropping items or worsening paresthesias affecting the median nerve distribution.     She was prescribed Verapmil at her last visit for migraine prevention, which was ineffective. She has also tried and failed Topamax, Elavil, and Propranolol, and tried Botox for her migraines, which was helpful; however, she prefers not to continue, secondary to cost issues.     Review of Systems   Constitutional: Negative for fever and malaise/fatigue.   Eyes: Negative for double vision. "   Respiratory: Negative for wheezing.    Cardiovascular: Negative for leg swelling.   Gastrointestinal: Positive for nausea. Negative for vomiting.   Genitourinary: Negative for urgency.   Musculoskeletal: Positive for neck pain.   Skin: Negative for rash.   Neurological: Positive for tingling, sensory change and headaches. Negative for dizziness and focal weakness.   Endo/Heme/Allergies: Negative for polydipsia. Does not bruise/bleed easily.   Psychiatric/Behavioral: Positive for memory loss. Negative for depression. The patient is not nervous/anxious and does not have insomnia.      Objective:     Physical Exam    Exam:  Gen Appearance, well developed/nourished in no apparent distress  CV: 2+ distal pulses with no edema or swelling  Neuro:  MS: Awake, alert,  Sustains attention. Recent/remote memory intact, Language is full to spontaneous speech/comprehension. Fund of Knowledge is full  CN: Optic discs are flat with normal vasculature, PERRL, Extraoccular movements and visual fields are full. Normal facial strength, Tongue and Palate are midline and strong. Shoulder Shrug symmetric and strong.  Motor: Normal bulk, tone, no abnormal movements. 5/5 strength bilateral upper/lower extremities with 2+ reflexes  Sensory:  Romberg negative and sensation intact to light touch, vibration, and pain  Cerebellar: Finger-nose, Rapid alternating movements intact  Gait: Normal stance, no ataxia    Imaging:   Carotid US 1/23/2017  No ultrasound evidence of hemodynamically significant stenosis.    MRI Brain 1/23/2017  1.  No MRI evidence of an acute intracranial abnormality.  Stable, nonspecific 6.5 mm area of enhancement involving the left flor unchanged when compare with the prior study of 12/07/2012.  This may represent a small focal venous angioma or vascular anomaly.  2. Mild atrophy and minimal small vessel ischemic changes of the periventricular white matter.    MRA Brain 1/23/2017  Normal brain MRA.    Echo  1/23/2017  CONCLUSIONS     1 - Normal left ventricular systolic function (EF 60-65%).     2 - Normal right ventricular systolic function .     3 - No evidence of intracardiac shunt.     EEG 1/24/2017:  Clinical Impression: Normal awake and Drowsy EEG.     Labs from 1/23/2017 revealed a Vitamin D deficiency and hyperglycemia.          Assessment:   Tammie Sanders is a 59 y.o. 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 teleangiectasia, with no treatment needed per neurosurgery, mild bilateral CTS. She presents today for a hospital follow up after an episode of transient global amnesia. Her workup for TIA was overall unrevealing. She continues to have some cognitive complaints, which she attributes to changing her hormone replacement patch.     Plan:     1. Start Vitamin D3 5000IU 1 po qd for Vitamin D deficiency, as this can contribute to cognitive complaints. She will have her hormone replacement patch changed this week. I plan to re-assess her overall memory/cognition at her next visit, and may consider a neuropsych consult with ongoing complaints.   2. Her BP is elevated today. I will re-assess at her next visit, as she is currently without diagnosis of HTN. This could be contributing to her increased headaches.   3. Recheck Vitamin D level at next visit, as well as an HgA1c and CMP at next visit, given her fasting hyperglycemia.   4. Continue prn Imitrex. Her headaches are not currently frequent enough to justify preventative therapy; however, she has tried and failed Verapmil, Propranolol, Elavil, and Topamax, and was unable to continue Botox, secondary to cost concerns.     RTC in 6 weeks.

## 2017-03-30 ENCOUNTER — LAB VISIT (OUTPATIENT)
Dept: LAB | Facility: HOSPITAL | Age: 62
End: 2017-03-30
Attending: NURSE PRACTITIONER
Payer: COMMERCIAL

## 2017-03-30 ENCOUNTER — OFFICE VISIT (OUTPATIENT)
Dept: NEUROLOGY | Facility: CLINIC | Age: 62
End: 2017-03-30
Payer: COMMERCIAL

## 2017-03-30 VITALS
HEART RATE: 68 BPM | DIASTOLIC BLOOD PRESSURE: 82 MMHG | BODY MASS INDEX: 25.14 KG/M2 | WEIGHT: 128.06 LBS | SYSTOLIC BLOOD PRESSURE: 132 MMHG | RESPIRATION RATE: 14 BRPM | HEIGHT: 60 IN

## 2017-03-30 DIAGNOSIS — E55.9 VITAMIN D DEFICIENCY: ICD-10-CM

## 2017-03-30 DIAGNOSIS — G43.001 MIGRAINE WITHOUT AURA AND WITH STATUS MIGRAINOSUS, NOT INTRACTABLE: ICD-10-CM

## 2017-03-30 DIAGNOSIS — R20.0 ARM NUMBNESS LEFT: ICD-10-CM

## 2017-03-30 DIAGNOSIS — R73.01 FASTING HYPERGLYCEMIA: ICD-10-CM

## 2017-03-30 DIAGNOSIS — G45.4 TRANSIENT GLOBAL AMNESIA: Primary | ICD-10-CM

## 2017-03-30 LAB
25(OH)D3+25(OH)D2 SERPL-MCNC: 45 NG/ML
ALBUMIN SERPL BCP-MCNC: 4.3 G/DL
ALP SERPL-CCNC: 60 U/L
ALT SERPL W/O P-5'-P-CCNC: 18 U/L
ANION GAP SERPL CALC-SCNC: 10 MMOL/L
AST SERPL-CCNC: 18 U/L
BILIRUB SERPL-MCNC: 0.9 MG/DL
BUN SERPL-MCNC: 10 MG/DL
CALCIUM SERPL-MCNC: 9.5 MG/DL
CHLORIDE SERPL-SCNC: 106 MMOL/L
CO2 SERPL-SCNC: 25 MMOL/L
CREAT SERPL-MCNC: 0.8 MG/DL
EST. GFR  (AFRICAN AMERICAN): >60 ML/MIN/1.73 M^2
EST. GFR  (NON AFRICAN AMERICAN): >60 ML/MIN/1.73 M^2
GLUCOSE SERPL-MCNC: 106 MG/DL
POTASSIUM SERPL-SCNC: 4.8 MMOL/L
PROT SERPL-MCNC: 7.5 G/DL
SODIUM SERPL-SCNC: 141 MMOL/L

## 2017-03-30 PROCEDURE — 82306 VITAMIN D 25 HYDROXY: CPT

## 2017-03-30 PROCEDURE — 83036 HEMOGLOBIN GLYCOSYLATED A1C: CPT

## 2017-03-30 PROCEDURE — 36415 COLL VENOUS BLD VENIPUNCTURE: CPT

## 2017-03-30 PROCEDURE — 1160F RVW MEDS BY RX/DR IN RCRD: CPT | Mod: S$GLB,,, | Performed by: NURSE PRACTITIONER

## 2017-03-30 PROCEDURE — 99999 PR PBB SHADOW E&M-EST. PATIENT-LVL III: CPT | Mod: PBBFAC,,, | Performed by: NURSE PRACTITIONER

## 2017-03-30 PROCEDURE — 99214 OFFICE O/P EST MOD 30 MIN: CPT | Mod: S$GLB,,, | Performed by: NURSE PRACTITIONER

## 2017-03-30 PROCEDURE — 80053 COMPREHEN METABOLIC PANEL: CPT

## 2017-03-30 RX ORDER — ESTRADIOL/NORETHINDRONE ACETATE TRANSDERMAL SYSTEM .05; .14 MG/D; MG/D
1 PATCH, EXTENDED RELEASE TRANSDERMAL
Refills: 7 | COMMUNITY
Start: 2017-03-14 | End: 2017-06-26 | Stop reason: SDUPTHER

## 2017-03-30 RX ORDER — LANOLIN ALCOHOL/MO/W.PET/CERES
400 CREAM (GRAM) TOPICAL DAILY
COMMUNITY
End: 2017-08-14

## 2017-03-30 RX ORDER — ACETAMINOPHEN 500 MG
5000 TABLET ORAL DAILY
COMMUNITY

## 2017-03-30 NOTE — MR AVS SNAPSHOT
[facility]  [address] [facility] [address]  Phone: [phone]  Fax: [phone]                  Tammie Sanders   3/30/2017 8:00 AM   Office Visit    Description:  Female : 1955   Provider:  Nhung Bunn NP   Department:  [facility]           Reason for Visit     Neurologic Problem           Diagnoses this Visit        Comments    Fasting hyperglycemia    -  Primary     Vitamin D deficiency                To Do List           Goals (5 Years of Data)     None      Follow-Up and Disposition     Return in about 6 months (around 2017).      [facility]     [facility] Call Nurse Care Line -  Assistance  Unless otherwise directed by your provider, please contact [facility], our nurse care line that is available for  assistance.     Registered nurses in the [facility] provide: appointment scheduling, clinical advisement, health education, and other advisory services.  Call: [phone] (toll free)               Medications           Message regarding Medications     Verify the changes and/or additions to your medication regime listed below are the same as discussed with your clinician today.  If any of these changes or additions are incorrect, please notify your healthcare provider.        STOP taking these medications     estradiol-levonorgestrel (CLIMARAPRO) 0.045-0.015 mg/24 hr Place 1 patch onto the skin once a week.           Verify that the below list of medications is an accurate representation of the medications you are currently taking.  If none reported, the list may be blank. If incorrect, please contact your healthcare provider. Carry this list with you in case of emergency.           Current Medications     cholecalciferol, vitamin D3, (VITAMIN D3) 5,000 unit Tab Take 5,000 Units by mouth once daily.    COMBIPATCH 0.05-0.14 mg/24 hr 1 patch twice a week.    magnesium oxide (MAG-OX) 400 mg tablet Take 400  mg by mouth once daily.    sumatriptan (IMITREX) 100 MG tablet Take one daily PRN headache. May repeat dose once after 2 hours           Clinical Reference Information           Your Vitals Were     BP Pulse Resp Height Weight BMI    132/82 (BP Location: Left arm, Patient Position: Sitting, BP Method: Manual) 68 14 5' (1.524 m) 58.1 kg (128 lb 1.4 oz) 25.02 kg/m2      Blood Pressure          Most Recent Value    BP  132/82      Allergies as of 3/30/2017     No Known Drug Allergies      Immunizations Administered on Date of Encounter - 3/30/2017     None      Orders Placed During Today's Visit     Future Labs/Procedures Expected by Expires    Comprehensive metabolic panel  3/30/2017 3/30/2018    HEMOGLOBIN A1C  3/30/2017 5/29/2018    Vitamin D  3/30/2017 5/29/2018      Language Assistance Services     ATTENTION: Language assistance services are available, free of charge. Please call [phone].      ATENCIÓN: Si habla tammy, tiene a ritter disposición servicios gratuitos de asistencia lingüística. Llame al [phone].     CHÚ Ý: N?u b?n nói Ti?ng Vi?t, có các d?ch v? h? tr? ngôn ng? mi?n phí dành cho b?n. G?i s? [phone].         [facility] complies with applicable Federal civil rights laws and does not discriminate on the basis of race, color, national origin, age, disability, or sex.

## 2017-03-30 NOTE — PROGRESS NOTES
"HPI: Tammie Sanders is a 59 y.o. 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 teleangiectasia, with no treatment needed per neurosurgery, mild bilateral CTS. She was admitted to the hospital in 1/2017 for 30 minutes of transient global amnesia, with a negative workup for CVA/TIA/seizure.     She has had no further episodes of memory loss. Her complaint of "fuzziness" has improved since changing back to her prior hormone replacement patch.     Continues with 1-2 headaches every other week. Her headaches are located mostly to the left parietal area, are mild to moderate in severity, are throbbing in nature, and are easily aborted with Imitrex, which she takes sparingly. She does have occasional nausea and neck pain. She denies any photophobia, phonophobia, autonomic complaints, speech difficulty, focal weakness, and paresthesias with her migraines. Her headaches are triggered by certain aromas. Her headaches are helped with M-graine essential oil blend at times. She has also tried and failed Verapmil, Topamax, Elavil, and Propranolol, and tried Botox for her migraines, which was helpful; however, she prefers not to continue, secondary to cost issues.     She also complains of having 1-2 headaches per week since switching her hormone replacement patch. She continues to have transient LUE paresthesias; however, this is long standing. Her CTS is stable. She denies dropping items or worsening paresthesias affecting the median nerve distribution.     She is requesting that I review all testing done at her hospital admission, and wants to know what she was tested for.     Review of Systems   Constitutional: Negative for fever and malaise/fatigue.   Eyes: Negative for double vision.   Respiratory: Negative for wheezing.    Cardiovascular: Negative for leg swelling.   Gastrointestinal: Negative for nausea and " vomiting.   Genitourinary: Negative for urgency.   Musculoskeletal: Positive for neck pain.   Skin: Negative for rash.   Neurological: Positive for tingling, sensory change and headaches. Negative for dizziness and focal weakness.   Endo/Heme/Allergies: Negative for polydipsia. Does not bruise/bleed easily.   Psychiatric/Behavioral: Positive for memory loss. Negative for depression. The patient is not nervous/anxious and does not have insomnia.      Objective:     Physical Exam    Exam:  Gen Appearance, well developed/nourished in no apparent distress  CV: 2+ distal pulses with no edema or swelling  Neuro:  MS: Awake, alert,  Sustains attention. Recent/remote memory intact, Language is full to spontaneous speech/comprehension. Fund of Knowledge is full  CN: Optic discs are flat with normal vasculature, PERRL, Extraoccular movements and visual fields are full. Normal facial strength, Tongue and Palate are midline and strong. Shoulder Shrug symmetric and strong.  Motor: Normal bulk, tone, no abnormal movements. 5/5 strength bilateral upper/lower extremities with 2+ reflexes  Sensory:  Romberg negative and sensation intact to light touch, vibration, and pain  Cerebellar: Finger-nose, Rapid alternating movements intact  Gait: Normal stance, no ataxia    Imaging:   Carotid US 1/23/2017  No ultrasound evidence of hemodynamically significant stenosis.    MRI Brain 1/23/2017  1.  No MRI evidence of an acute intracranial abnormality.  Stable, nonspecific 6.5 mm area of enhancement involving the left flor unchanged when compare with the prior study of 12/07/2012.  This may represent a small focal venous angioma or vascular anomaly.  2. Mild atrophy and minimal small vessel ischemic changes of the periventricular white matter.    MRA Brain 1/23/2017  Normal brain MRA.    Echo 1/23/2017  CONCLUSIONS     1 - Normal left ventricular systolic function (EF 60-65%).     2 - Normal right ventricular systolic function .     3 - No  "evidence of intracardiac shunt.     EEG 1/24/2017:  Clinical Impression: Normal awake and Drowsy EEG.     Labs from 1/23/2017 revealed a Vitamin D deficiency and hyperglycemia.     Assessment:   Tammie Sanders is a 59 y.o. 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 teleangiectasia, with no treatment needed per neurosurgery, mild bilateral CTS. She presents today for a hospital follow up after an episode of transient global amnesia. Her workup for TIA was overall unrevealing. She continues to have some cognitive complaints, which she attributes to changing her hormone replacement patch.     Plan:   I recommend:  1. Repeat Vitamin D level, CMP, and HgA1c, given her Vitamin D deficiency and fasting hyperglycemia on admit, as she continues with complaint of "fuzziness" at times.   2. Continue Vitamin D supplementation.   3. Her BP is improved today; will continue to monitor as this may possibly be contributing to her episodic headaches.   4. Continue prn Imitrex. Her headaches are not currently frequent enough to justify preventative therapy; however, she has tried and failed Verapmil, Propranolol, Elavil, and Topamax, and prefers not to repeat Botox.     RTC in 6 weeks.     "

## 2017-03-31 LAB
ESTIMATED AVG GLUCOSE: 120 MG/DL
HBA1C MFR BLD HPLC: 5.8 %

## 2017-06-22 NOTE — TELEPHONE ENCOUNTER
----- Message from Nazia Gillette sent at 6/22/2017  3:56 PM CDT -----  Contact: Self  Tmamie Sanders  MRN: [id number]  Home Phone      [phone]  Work Phone      Not on file.  Mobile          [phone]    Patient Care Team:  Ignacia Chan MD as PCP - General (Family Medicine)  Dayan Duron MD as Obstetrician (Obstetrics)  OB? No  What phone number can you be reached at? [phone]  Message:   Patient states she needs a refill on her Combipatch .05 called in to [facility] in [place]. Please return call.

## 2017-06-26 RX ORDER — ESTRADIOL/NORETHINDRONE ACETATE TRANSDERMAL SYSTEM .05; .14 MG/D; MG/D
1 PATCH, EXTENDED RELEASE TRANSDERMAL
Qty: 8 PATCH | Refills: 0 | Status: SHIPPED | OUTPATIENT
Start: 2017-06-26 | End: 2017-08-14 | Stop reason: SDUPTHER

## 2017-06-26 NOTE — TELEPHONE ENCOUNTER
Tammie desire refill of Combi patch. Patient due for annual next month. Attempted to contact patient so scheduled with no success.   Patient Active Problem List   Diagnosis    Migraine without aura    Congenital anomaly of cerebrovascular system    Postmenopausal state    Arm numbness left    Transient global amnesia    Vitamin D deficiency     Prior to Admission medications    Medication Sig Start Date End Date Taking? Authorizing Provider   cholecalciferol, vitamin D3, (VITAMIN D3) 5,000 unit Tab Take 5,000 Units by mouth once daily.    Historical Provider, MD   COMBIPATCH 0.05-0.14 mg/24 hr 1 patch twice a week. 3/14/17   Historical Provider, MD   magnesium oxide (MAG-OX) 400 mg tablet Take 400 mg by mouth once daily.    Historical Provider, MD   sumatriptan (IMITREX) 100 MG tablet Take one daily PRN headache. May repeat dose once after 2 hours 1/19/15   Kit Steel MD

## 2017-07-05 RX ORDER — ESTRADIOL/NORETHINDRONE ACETATE TRANSDERMAL SYSTEM .05; .14 MG/D; MG/D
PATCH, EXTENDED RELEASE TRANSDERMAL
Qty: 8 PATCH | Refills: 1 | OUTPATIENT
Start: 2017-07-05

## 2017-08-14 ENCOUNTER — OFFICE VISIT (OUTPATIENT)
Dept: OBSTETRICS AND GYNECOLOGY | Facility: CLINIC | Age: 62
End: 2017-08-14
Payer: COMMERCIAL

## 2017-08-14 VITALS
HEART RATE: 69 BPM | SYSTOLIC BLOOD PRESSURE: 120 MMHG | BODY MASS INDEX: 25.52 KG/M2 | DIASTOLIC BLOOD PRESSURE: 70 MMHG | HEIGHT: 60 IN | WEIGHT: 130 LBS

## 2017-08-14 DIAGNOSIS — Z78.0 POSTMENOPAUSAL: ICD-10-CM

## 2017-08-14 DIAGNOSIS — Z76.0 MEDICATION REFILL: ICD-10-CM

## 2017-08-14 DIAGNOSIS — Z79.890 POSTMENOPAUSAL HRT (HORMONE REPLACEMENT THERAPY): ICD-10-CM

## 2017-08-14 DIAGNOSIS — Z01.419 WELL WOMAN EXAM WITH ROUTINE GYNECOLOGICAL EXAM: Primary | ICD-10-CM

## 2017-08-14 PROCEDURE — 99999 PR PBB SHADOW E&M-EST. PATIENT-LVL II: CPT | Mod: PBBFAC,,, | Performed by: OBSTETRICS & GYNECOLOGY

## 2017-08-14 PROCEDURE — 99396 PREV VISIT EST AGE 40-64: CPT | Mod: S$GLB,,, | Performed by: OBSTETRICS & GYNECOLOGY

## 2017-08-14 RX ORDER — SUMATRIPTAN SUCCINATE 100 MG/1
TABLET ORAL
Qty: 9 TABLET | Refills: 2 | Status: SHIPPED | OUTPATIENT
Start: 2017-08-14 | End: 2017-08-14 | Stop reason: SDUPTHER

## 2017-08-14 RX ORDER — ESTRADIOL/NORETHINDRONE ACETATE TRANSDERMAL SYSTEM .05; .14 MG/D; MG/D
1 PATCH, EXTENDED RELEASE TRANSDERMAL
Qty: 8 PATCH | Refills: 11 | Status: SHIPPED | OUTPATIENT
Start: 2017-08-14 | End: 2017-08-14 | Stop reason: SDUPTHER

## 2017-08-14 RX ORDER — SUMATRIPTAN SUCCINATE 100 MG/1
TABLET ORAL
Qty: 9 TABLET | Refills: 2 | Status: SHIPPED | OUTPATIENT
Start: 2017-08-14 | End: 2018-03-28 | Stop reason: SDUPTHER

## 2017-08-14 RX ORDER — ESTRADIOL/NORETHINDRONE ACETATE TRANSDERMAL SYSTEM .05; .14 MG/D; MG/D
1 PATCH, EXTENDED RELEASE TRANSDERMAL
Qty: 8 PATCH | Refills: 11 | Status: SHIPPED | OUTPATIENT
Start: 2017-08-14 | End: 2018-05-08

## 2017-08-14 NOTE — PROGRESS NOTES
Subjective:    Patient ID: Tammie Sanders is a 61 y.o. female.     Chief Complaint: Annual Well Woman Exam     History of Present Illness:  Tammie presents today for Annual Well Woman exam. .No LMP recorded. Patient is postmenopausal.. She is currently using Combipatch for HRT and desires to continue.  She denies dyspareunia. She denies breast tenderness, masses, nipple discharge.  She declines mammogram.  She reports no problems with urination. Bowel movements have not significantly changed.  She declines colonoscopy.  She takes supplemental vitamin D.  She declines DEXA at this time but has had recent bone density evaluation that was reportedly normal.  She is current with health maintenance labs.  She would like a refill of her migraine medications.  She plans to follow back up with her neurologist in October.     Past Medical History:   Diagnosis Date    Migraine headache      History reviewed. No pertinent surgical history.  Review of patient's allergies indicates:   Allergen Reactions    No known drug allergies      Current Outpatient Prescriptions on File Prior to Visit   Medication Sig Dispense Refill    cholecalciferol, vitamin D3, (VITAMIN D3) 5,000 unit Tab Take 5,000 Units by mouth once daily.      [DISCONTINUED] COMBIPATCH 0.05-0.14 mg/24 hr Place 1 patch onto the skin twice a week. 8 patch 0    [DISCONTINUED] sumatriptan (IMITREX) 100 MG tablet Take one daily PRN headache. May repeat dose once after 2 hours 9 tablet 11    [DISCONTINUED] magnesium oxide (MAG-OX) 400 mg tablet Take 400 mg by mouth once daily.       No current facility-administered medications on file prior to visit.      Social History   Substance Use Topics    Smoking status: Former Smoker     Packs/day: 1.00     Years: 25.00     Types: Cigarettes     Quit date: 1/1/1997    Smokeless tobacco: Never Used    Alcohol use No     Family History   Problem Relation Age of Onset    Migraines Daughter     Heart disease Mother      Cancer Father     Breast cancer Neg Hx     Colon cancer Neg Hx     Ovarian cancer Neg Hx      The following portions of the patient's history were reviewed and updated as appropriate: allergies, current medications, past family history, past medical history, past social history, past surgical history and problem list.      Menstrual History:   No LMP recorded. Patient is postmenopausal..     OB History      Para Term  AB Living    3 3            SAB TAB Ectopic Multiple Live Births                       Review of Systems   Neurological:        Positive for migraine headaches (~once weekly)   All other systems reviewed and are negative.        Objective:    Vital Signs:  Vitals:    17 1031   BP: 120/70   Pulse: 69       Physical Exam:  General:  alert; oriented x 4; well-nourished female   Skin:  Skin color, texture, turgor normal. No rashes or lesions   HEENT:  conjunctivae/corneas clear.    Neck: supple, trachea midline   Respiratory:  clear to auscultation bilaterally   Heart:  regular rate and rhythm   Breasts: Symmetrical;   Nipples are protruding and have no nipple discharge. No palpable masses, erythema, skin changes, tenderness, or adenopathy.   Abdomen:  soft, non-tender. Bowel sounds normal. No masses,  no organomegaly   Pelvis: External genitalia: normal general appearance  Urinary system: urethral meatus normal, bladder nontender  Vaginal: atrophic mucosa; no prolapse or lesions  Cervix: normal appearance; nontender  Uterus: normal single, nontender  Adnexa: normal bimanual exam; nontender; no palpable masses   Extremities: Normal ROM; no edema, no cyanosis   Neurologial: Normal strength and tone. No focal numbness or weakness. Reflexes 2+ and equal.   Psychiatric: normal mood, speech, dress, and thought processes         Assessment:      1. Well woman exam with routine gynecological exam    2. Postmenopausal    3. Postmenopausal HRT (hormone replacement therapy)    4. Medication  refill          Plan:      Well woman exam with routine gynecological exam    Postmenopausal    Postmenopausal HRT (hormone replacement therapy)  -     Discontinue: COMBIPATCH 0.05-0.14 mg/24 hr; Place 1 patch onto the skin twice a week.  Dispense: 8 patch; Refill: 11  -     COMBIPATCH 0.05-0.14 mg/24 hr; Place 1 patch onto the skin twice a week.  Dispense: 8 patch; Refill: 11    Medication refill  -     Discontinue: sumatriptan (IMITREX) 100 MG tablet; Take one daily PRN headache. May repeat dose once after 2 hours  Dispense: 9 tablet; Refill: 2  -     sumatriptan (IMITREX) 100 MG tablet; Take one daily PRN headache. May repeat dose once after 2 hours  Dispense: 9 tablet; Refill: 2        COUNSELING:  Tammie was counseled on A.C.O.G. Pap guidelines and recommendations for yearly pelvic exams in addition to recommendations for yearly mammograms and for screening colonoscopy.  In addition she was counseled on adequate intake of calcium and vitamin D and recommendations regarding bone density evaluation.  She is to see her PCP for other health maintenance.

## 2017-10-12 ENCOUNTER — TELEPHONE (OUTPATIENT)
Dept: NEUROLOGY | Facility: CLINIC | Age: 62
End: 2017-10-12

## 2017-10-12 DIAGNOSIS — R20.0 ARM NUMBNESS LEFT: Primary | ICD-10-CM

## 2017-10-12 DIAGNOSIS — M54.2 NECK PAIN: ICD-10-CM

## 2017-10-12 NOTE — TELEPHONE ENCOUNTER
"She has had the "fuzzy" feeling in her head before, as well as the left arm numbness. She does not have a recent MRI C-spine. I will order this now, and we can call her with results before her next appointment with me. Remind her that she must remove her medication patch before MRI.       "

## 2017-10-12 NOTE — TELEPHONE ENCOUNTER
----- Message from Yasmin Cazares sent at 10/12/2017 10:36 AM CDT -----  Contact: PATIENT  Tammie Sanders  MRN: [id number]  : 1955  PCP: Ignacia Chan  Home Phone      [phone]  Work Phone      Not on file.  Mobile          [phone]      MESSAGE: Patient states that her (L) is completely numb and it radiates to her neck.  Is there something that Nhung can do to help this problem.        Phone: [phone]

## 2017-10-12 NOTE — TELEPHONE ENCOUNTER
"Patient c/o L arm numbness and neck pain. Patient states that the numbness has been happening off an on for awhile now but it has been pretty consistent lately with the entire arm numb, hand and fingers as well. She also reports a "fuzzy" feeling in her head and headache that comes and goes as well. She is on a boat right now and they are heading inshore today. She is asking for advise.  "

## 2017-10-15 ENCOUNTER — HOSPITAL ENCOUNTER (EMERGENCY)
Facility: HOSPITAL | Age: 62
Discharge: HOME | End: 2017-10-15
Attending: SURGERY
Payer: COMMERCIAL

## 2017-10-15 VITALS
DIASTOLIC BLOOD PRESSURE: 76 MMHG | WEIGHT: 122 LBS | HEART RATE: 72 BPM | BODY MASS INDEX: 23.83 KG/M2 | TEMPERATURE: 98 F | RESPIRATION RATE: 16 BRPM | OXYGEN SATURATION: 98 % | SYSTOLIC BLOOD PRESSURE: 144 MMHG

## 2017-10-15 DIAGNOSIS — M54.12 CERVICAL RADICULOPATHY: ICD-10-CM

## 2017-10-15 LAB
25(OH)D3+25(OH)D2 SERPL-MCNC: 59 NG/ML
ALBUMIN SERPL BCP-MCNC: 4.4 G/DL
ALP SERPL-CCNC: 69 U/L
ALT SERPL W/O P-5'-P-CCNC: 17 U/L
ANION GAP SERPL CALC-SCNC: 11 MMOL/L
AST SERPL-CCNC: 18 U/L
BASOPHILS # BLD AUTO: 0.01 K/UL
BASOPHILS NFR BLD: 0.2 %
BILIRUB SERPL-MCNC: 1.1 MG/DL
BILIRUB UR QL STRIP: NEGATIVE
BNP SERPL-MCNC: 92 PG/ML
BUN SERPL-MCNC: 12 MG/DL
CALCIUM SERPL-MCNC: 9.8 MG/DL
CHLORIDE SERPL-SCNC: 105 MMOL/L
CK MB SERPL-MCNC: 1 NG/ML
CK MB SERPL-RTO: 0.8 %
CK SERPL-CCNC: 125 U/L
CK SERPL-CCNC: 125 U/L
CLARITY UR: CLEAR
CO2 SERPL-SCNC: 26 MMOL/L
COLOR UR: YELLOW
CREAT SERPL-MCNC: 0.8 MG/DL
DIFFERENTIAL METHOD: NORMAL
EOSINOPHIL # BLD AUTO: 0.1 K/UL
EOSINOPHIL NFR BLD: 1.5 %
ERYTHROCYTE [DISTWIDTH] IN BLOOD BY AUTOMATED COUNT: 13.5 %
EST. GFR  (AFRICAN AMERICAN): >60 ML/MIN/1.73 M^2
EST. GFR  (NON AFRICAN AMERICAN): >60 ML/MIN/1.73 M^2
FOLATE SERPL-MCNC: 16.6 NG/ML
GLUCOSE SERPL-MCNC: 115 MG/DL
GLUCOSE UR QL STRIP: NEGATIVE
HCT VFR BLD AUTO: 45.5 %
HGB BLD-MCNC: 15.4 G/DL
HGB UR QL STRIP: ABNORMAL
KETONES UR QL STRIP: NEGATIVE
LEUKOCYTE ESTERASE UR QL STRIP: NEGATIVE
LYMPHOCYTES # BLD AUTO: 2.2 K/UL
LYMPHOCYTES NFR BLD: 35.3 %
MCH RBC QN AUTO: 29.1 PG
MCHC RBC AUTO-ENTMCNC: 33.8 G/DL
MCV RBC AUTO: 86 FL
MONOCYTES # BLD AUTO: 0.4 K/UL
MONOCYTES NFR BLD: 6.2 %
NEUTROPHILS # BLD AUTO: 3.5 K/UL
NEUTROPHILS NFR BLD: 56.8 %
NITRITE UR QL STRIP: NEGATIVE
PH UR STRIP: 6 [PH] (ref 5–8)
PLATELET # BLD AUTO: 296 K/UL
PMV BLD AUTO: 9.7 FL
POTASSIUM SERPL-SCNC: 3.9 MMOL/L
PROT SERPL-MCNC: 7.9 G/DL
PROT UR QL STRIP: NEGATIVE
RBC # BLD AUTO: 5.3 M/UL
SODIUM SERPL-SCNC: 142 MMOL/L
SP GR UR STRIP: <=1.005 (ref 1–1.03)
TROPONIN I SERPL DL<=0.01 NG/ML-MCNC: <0.006 NG/ML
TSH SERPL DL<=0.005 MIU/L-ACNC: 2.21 UIU/ML
URN SPEC COLLECT METH UR: ABNORMAL
UROBILINOGEN UR STRIP-ACNC: NEGATIVE EU/DL
VIT B12 SERPL-MCNC: 409 PG/ML
WBC # BLD AUTO: 6.17 K/UL

## 2017-10-15 PROCEDURE — 82746 ASSAY OF FOLIC ACID SERUM: CPT

## 2017-10-15 PROCEDURE — 82553 CREATINE MB FRACTION: CPT

## 2017-10-15 PROCEDURE — 82306 VITAMIN D 25 HYDROXY: CPT

## 2017-10-15 PROCEDURE — 81003 URINALYSIS AUTO W/O SCOPE: CPT

## 2017-10-15 PROCEDURE — 85025 COMPLETE CBC W/AUTO DIFF WBC: CPT

## 2017-10-15 PROCEDURE — 93010 ELECTROCARDIOGRAM REPORT: CPT | Mod: ,,, | Performed by: INTERNAL MEDICINE

## 2017-10-15 PROCEDURE — 63600175 PHARM REV CODE 636 W HCPCS: Performed by: SURGERY

## 2017-10-15 PROCEDURE — 93005 ELECTROCARDIOGRAM TRACING: CPT

## 2017-10-15 PROCEDURE — 96372 THER/PROPH/DIAG INJ SC/IM: CPT

## 2017-10-15 PROCEDURE — 99284 EMERGENCY DEPT VISIT MOD MDM: CPT | Mod: 25

## 2017-10-15 PROCEDURE — 84443 ASSAY THYROID STIM HORMONE: CPT

## 2017-10-15 PROCEDURE — 80053 COMPREHEN METABOLIC PANEL: CPT

## 2017-10-15 PROCEDURE — 83880 ASSAY OF NATRIURETIC PEPTIDE: CPT

## 2017-10-15 PROCEDURE — 84484 ASSAY OF TROPONIN QUANT: CPT

## 2017-10-15 PROCEDURE — 36415 COLL VENOUS BLD VENIPUNCTURE: CPT

## 2017-10-15 PROCEDURE — 82607 VITAMIN B-12: CPT

## 2017-10-15 RX ORDER — KETOROLAC TROMETHAMINE 10 MG/1
10 TABLET, FILM COATED ORAL EVERY 6 HOURS PRN
Qty: 15 TABLET | Refills: 0 | Status: SHIPPED | OUTPATIENT
Start: 2017-10-15 | End: 2018-09-06

## 2017-10-15 RX ORDER — CYCLOBENZAPRINE HCL 10 MG
10 TABLET ORAL 3 TIMES DAILY PRN
Qty: 10 TABLET | Refills: 0 | Status: SHIPPED | OUTPATIENT
Start: 2017-10-15 | End: 2017-10-20

## 2017-10-15 RX ORDER — METHYLPREDNISOLONE 4 MG/1
TABLET ORAL
Qty: 1 PACKAGE | Refills: 0 | Status: SHIPPED | OUTPATIENT
Start: 2017-10-15 | End: 2018-05-08

## 2017-10-15 RX ORDER — LORAZEPAM 2 MG/ML
1 INJECTION INTRAMUSCULAR
Status: COMPLETED | OUTPATIENT
Start: 2017-10-15 | End: 2017-10-15

## 2017-10-15 RX ADMIN — LORAZEPAM 1 MG: 2 INJECTION INTRAMUSCULAR; INTRAVENOUS at 11:10

## 2017-10-15 NOTE — ED PROVIDER NOTES
[facility]                                     October 15, 2017                   Chief Complaint  61 y.o. female with Numbness (lt neck and shoulder x 4 days)    History of Present Illness  Tammie Sanders presents to the emergency room with left neck pain/shoulder pain  Patient states she has had left neck pain and shoulder pain with tingling down the left arm  Patient states these symptoms have been present since last Thursday, denies any trauma  Patient had similar symptoms in January 2017 with a largely negative workup at that time  Patient had a negative MRI the brain on echo, carotid with neurology consult at that time  Denies any headache states the left arm numbness since Thursday is identical to January  Patient has good  and normal tone, neurovascular intact on evaluation this afternoon    The history is provided by the patient  Past medical history: Migraine headache  Past surgical history  NO KNOWN DRUG ALLERGIES  Social history: Works on shrimp boat with , 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    Review of Systems and Physical Exam     Review of Systems  -- Constitution - no fever, denies fatigue, no weakness, no chills  -- Eyes - no tearing or redness, no visual disturbance  -- Ear, Nose - no tinnitus or earache, no nasal congestion or discharge  -- Mouth,Throat - no sore throat, no toothache, normal voice, normal swallowing  -- Respiratory - denies cough and congestion, no shortness of breath, no CROWLEY  -- Cardiovascular - denies chest pain, no palpitations, denies claudication  -- Gastrointestinal - denies abdominal pain, nausea, vomiting, or diarrhea  -- Musculoskeletal - denies back pain, negative for myalgias and arthralgias   -- Neurological - left arm numbness for the past 4 days  -- Skin - denies pallor, rash, or changes in skin. no hives or welts noted    Vital Signs  -- Her blood pressure is 144/76 and her pulse is 72.   -- Her respiration is 16 and oxygen saturation is 98%.       Physical Exam  -- Nursing note and vitals reviewed  -- Head: Atraumatic. Normocephalic. No obvious abnormality  -- Eyes: Pupils are equal and reactive to light. Normal conjunctiva and lids  -- Neck: Normal range of motion. Neck supple. No masses, trachea midline  -- Cardiac: Normal rate, regular rhythm and normal heart sounds  -- Pulmonary: Normal respiratory effort, breath sounds clear to auscultation  -- Abdominal: Soft, no tenderness. Normal bowel sounds. Normal liver edge  -- Musculoskeletal: Normal range of motion, no effusions. Joints stable   -- Neurological: No focal deficits. Showed good interaction with staff  -- Vascular: Posterior tibial, dorsalis pedis and radial pulses 2+ bilaterally      Emergency Room Course     Treatment and Evaluation  -- The CT of the head performed in the ER today was negative for acute pathology   -- The EKG findings today were without concerning findings from baseline   -- Chest x-ray showed no infiltrate and showed no acute pathology   -- The electrolytes drawn in the ER today were within normal limits   -- The CBC drawn in the ER today was within normal limits   -- The troponin drawn in the ER today was within normal limits   -- The BNP drawn in the ER today were within normal limits   -- TSH drawn in the ER today was negative    -- B12, vitamin D and folate are all pending    CT C-spine  -- At C5-6 there is spondylosis and bilateral neural foraminal stenosis, right greater   -- than left, with unchanged from prior MR of February 11, 2014.   -- Spinal canal stenosis is not seen.    ED Physician Notes  -- Negative workup in the emergency room, MRI scheduled for 48 hours/Tuesday  -- Will alert the patient's neurology team in [facility] for close follow-up and evaluation    Medications Given  -- 1 mg IM Ativan given in the ER    Diagnosis  -- The encounter diagnosis was Cervical radiculopathy.    Disposition and Plan  -- Disposition: home  -- Condition: stable  -- Follow-up:  Patient to follow up with neurology  in 1-2 days.  -- I advised the patient that we have found no life threatening condition today  -- At this time, I believe the patient is clinically stable for discharge.   -- The patient acknowledges that close follow up with a MD is required   -- Patient agrees to comply with all instruction and direction given in the ER    This note is dictated on Dragon Natural Speaking word recognition program.  There are word recognition mistakes that are occasionally missed on review.           Camacho Wylie MD  10/15/17 0003

## 2017-10-17 ENCOUNTER — HOSPITAL ENCOUNTER (OUTPATIENT)
Dept: RADIOLOGY | Facility: HOSPITAL | Age: 62
Discharge: HOME | End: 2017-10-17
Attending: NURSE PRACTITIONER
Payer: COMMERCIAL

## 2017-10-17 DIAGNOSIS — M54.2 NECK PAIN: ICD-10-CM

## 2017-10-17 DIAGNOSIS — R20.0 ARM NUMBNESS LEFT: Primary | ICD-10-CM

## 2017-10-17 DIAGNOSIS — R20.0 ARM NUMBNESS LEFT: ICD-10-CM

## 2017-10-17 PROCEDURE — 72141 MRI NECK SPINE W/O DYE: CPT | Mod: 26,,, | Performed by: RADIOLOGY

## 2017-10-17 PROCEDURE — 72141 MRI NECK SPINE W/O DYE: CPT | Mod: TC

## 2017-11-02 ENCOUNTER — PROCEDURE VISIT (OUTPATIENT)
Dept: NEUROLOGY | Facility: CLINIC | Age: 62
End: 2017-11-02
Payer: COMMERCIAL

## 2017-11-02 DIAGNOSIS — G56.03 BILATERAL CARPAL TUNNEL SYNDROME: Primary | ICD-10-CM

## 2017-11-02 DIAGNOSIS — M54.2 NECK PAIN: ICD-10-CM

## 2017-11-02 DIAGNOSIS — R20.0 ARM NUMBNESS LEFT: ICD-10-CM

## 2017-11-02 PROCEDURE — 95911 NRV CNDJ TEST 9-10 STUDIES: CPT | Mod: S$GLB,,, | Performed by: PSYCHIATRY & NEUROLOGY

## 2017-11-02 PROCEDURE — 95886 MUSC TEST DONE W/N TEST COMP: CPT | Mod: S$GLB,,, | Performed by: PSYCHIATRY & NEUROLOGY

## 2017-11-02 NOTE — PROCEDURES
EMG - 2 Extremities  Date/Time: 11/2/2017 10:40 AM  Performed by: KENDALL STEEL  Authorized by: BLESSING BUNN     Nerve conduction test  Date/Time: 11/2/2017 10:40 AM  Performed by: KENDALL STEEL  Authorized by: BLESSING BUNN       REPORT OF EMG and NERVE CONDUCTION STUDY    Name: Tammie Sanders  Date of Study:  11/2/17  Referring Provider: FRANK Bunn  Test Performed by:  MD Milvia  Full Values Attached  Informed Consent Scanned.   No anesthesia used.   Amount of Blood Loss: none. The patient tolerated this procedure well.       Informed consent was obtained prior to performing this study. Two patient identifiers were confirmed with the patient prior to performing this study. A time out to determine correct patient and and agreement on procedure performed was conducted prior to the concentric needle examination.    Reason for the study: This study is compared to the 2014 EMG/NCS.  Nerve conduction studies of the bilateral median  (motor and sensory)nerves, bilateral ulnar (motor and sensory)  nerves, and bilateral radial sensory nerves  were performed.     Median palm to wrist distal latency is prolonged to 3.2ms on the  right and 3.5ms on the left. There was no ulnar nerve block on  this study  Otherwise amplitudes, distal latencies, conduction velocities,  and F-waves were normal.   EMG of selected muscles of the left arm and left cervical  paraspinal muscles  were performed as indicated on the attached  sheets.  Insertional activity was normal without fasciculation or  fibrillation, normal sized and phasia of motor units.      Impression:  Abnormal Study secondary to the Presence of:     Mild bilateral Carpal tunnel syndrome  No evidence of cervical radiculopathy on  this exam. This is exam is largely unchanged from 2/2014 study    Kendall Steel M.D. [facility].     Recommendations (discussed at this visit):  She has not had any further arm numbness, but when she did  last month, she states this started with activity and pain started in the wrist and radiated up. I believe this is likely from CTS. CT head reassuring. Wear Brace PRN CTS symptoms and will consider ortho if worse symptoms again    RTC in 6 months  CC: FRANK Bunn

## 2018-03-28 DIAGNOSIS — Z76.0 MEDICATION REFILL: ICD-10-CM

## 2018-03-28 RX ORDER — SUMATRIPTAN SUCCINATE 100 MG/1
TABLET ORAL
Qty: 9 TABLET | Refills: 2 | Status: SHIPPED | OUTPATIENT
Start: 2018-03-28 | End: 2018-05-08 | Stop reason: SDUPTHER

## 2018-03-28 NOTE — TELEPHONE ENCOUNTER
Tammie desire refill of Imitrex last annual 08/14/17  Patient Active Problem List   Diagnosis    Migraine without aura    Congenital anomaly of cerebrovascular system    Postmenopausal state    Arm numbness left    Transient global amnesia    Vitamin D deficiency    Neck pain     Prior to Admission medications    Medication Sig Start Date End Date Taking? Authorizing Provider   cholecalciferol, vitamin D3, (VITAMIN D3) 5,000 unit Tab Take 5,000 Units by mouth once daily.    Historical Provider, MD   COMBIPATCH 0.05-0.14 mg/24 hr Place 1 patch onto the skin twice a week. 8/14/17   Dayan Duron MD   ketorolac (TORADOL) 10 mg tablet Take 1 tablet (10 mg total) by mouth every 6 (six) hours as needed for Pain. 10/15/17   Camacho Wylie MD   methylPREDNISolone (MEDROL DOSEPACK) 4 mg tablet Pack as directed 10/15/17   Camacho Wylie MD   sumatriptan (IMITREX) 100 MG tablet Take one daily PRN headache. May repeat dose once after 2 hours 8/14/17   Dayan Duron MD

## 2018-05-08 ENCOUNTER — OFFICE VISIT (OUTPATIENT)
Dept: NEUROLOGY | Facility: CLINIC | Age: 63
End: 2018-05-08
Payer: COMMERCIAL

## 2018-05-08 VITALS
RESPIRATION RATE: 14 BRPM | HEIGHT: 60 IN | WEIGHT: 132.06 LBS | BODY MASS INDEX: 25.93 KG/M2 | HEART RATE: 66 BPM | SYSTOLIC BLOOD PRESSURE: 118 MMHG | DIASTOLIC BLOOD PRESSURE: 70 MMHG

## 2018-05-08 DIAGNOSIS — G43.001 MIGRAINE WITHOUT AURA AND WITH STATUS MIGRAINOSUS, NOT INTRACTABLE: ICD-10-CM

## 2018-05-08 DIAGNOSIS — Z76.0 MEDICATION REFILL: ICD-10-CM

## 2018-05-08 DIAGNOSIS — G56.03 BILATERAL CARPAL TUNNEL SYNDROME: Primary | ICD-10-CM

## 2018-05-08 DIAGNOSIS — F41.9 ANXIETY: ICD-10-CM

## 2018-05-08 PROCEDURE — 99999 PR PBB SHADOW E&M-EST. PATIENT-LVL III: CPT | Mod: PBBFAC,,, | Performed by: PSYCHIATRY & NEUROLOGY

## 2018-05-08 PROCEDURE — 99214 OFFICE O/P EST MOD 30 MIN: CPT | Mod: S$GLB,,, | Performed by: PSYCHIATRY & NEUROLOGY

## 2018-05-08 PROCEDURE — 3008F BODY MASS INDEX DOCD: CPT | Mod: CPTII,S$GLB,, | Performed by: PSYCHIATRY & NEUROLOGY

## 2018-05-08 RX ORDER — SUMATRIPTAN SUCCINATE 100 MG/1
TABLET ORAL
Qty: 9 TABLET | Refills: 11 | Status: SHIPPED | OUTPATIENT
Start: 2018-05-08 | End: 2019-03-25 | Stop reason: SDUPTHER

## 2018-05-08 NOTE — PROGRESS NOTES
HPI: Tammie Sanders is a 62 y.o. female with common migraine with rebound headaches at times and nausea. 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 Improved headaches with Topamax, but some dysesthesia/ numbness in the left arm. EMG showed Mild bilateral Carpal tunnel syndrome  Since the last visit, the patient had EMG due to arm numbness  This is currently resolved after wearing Carpal Tunnel brace  Headaches are not very active.  She had been seeing NP, Nhung, whose notes are reviewed  She also presented in 2017 with transient amensia- TIA work up unremarkable then  Cognition is good.  PCP recommended she try an anxiety med- she was given effexor and has not used to date. She feels some situation anxiety and has some stressors with a grandson     Review of Systems   Constitutional: Negative for fever.   Eyes: Negative for double vision.   Respiratory: Negative for wheezing.    Cardiovascular: Negative for leg swelling.   Gastrointestinal: Negative for vomiting.   Genitourinary: Negative for urgency.   Musculoskeletal: Negative for neck pain.   Skin: Negative for rash.   Neurological: Positive for headaches. Negative for dizziness.   Endo/Heme/Allergies: Negative for polydipsia.   Psychiatric/Behavioral: The patient does not have insomnia.            Objective:      Physical Exam      Gen Appearance, well developed/nourished in no apparent distress  CV: 2+ distal pulses with no edema or swelling  Neuro:  MS: Awake, alert,  Sustains attention. Recent/remote memory intact, Language is full to spontaneous speech/comprehension. Fund of Knowledge is full  CN: Optic discs are flat with normal vasculature, PERRL, Extraoccular movements and visual fields are full. Normal facial strength, Tongue and Palate are midline and strong. Shoulder Shrug symmetric and strong.  Motor: Normal bulk, tone, no abnormal movements. 5/5 strength bilateral upper/lower  "extremities with 2+ reflexes  Sensory:  Romberg negative and sensation intact to light touch, vibration, and pain  Cerebellar: Finger-nose,  Rapid alternating movements intact  Gait: Normal stance, no ataxia    EEG: normal 2017    CT C spine 10/2017: At C5-6 there is spondylosis and bilateral neural foraminal stenosis, right greater than left, with unchanged from prior MR of February 11, 2014. Spinal canal stenosis is not seen.    CT head 10/2017: Negative Head CT.      EMG 2017: Mild bilateral CTS  Assessment:         Tammie Sanders is a 62 y.o. female with common migraine with rebound headaches at times and nausea. 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 Improved headaches with Topamax, but some dysesthesia/ numbness in the left arm. EMG showed Mild bilateral Carpal tunnel syndrome  In 2017, an episode of transient global amnesia. Her workup for TIA was overall unrevealing. Repeat EMG in   Plan:   1. Botox did help but she had far too much out of pocket expense. Currently, her headaches are far better controlled. Verapamil did not help prior. She has failed Topamax and previously had not relief with Elavil and propranol  2. CTS have responded to brace wearing. Could see ortho if worse at any.   3. MRI C spine /CT head reassuring some mild changes but not mild spinal stenosis  4. Her PCP recommended anxiety treatment- I suggested she consider this as well. She has an Rx from PCP  5. No specific treatment needed per neurosurgery for " cavernous malformation vs telangiectasia"  5. Imitrex to continue PRN with Fioricet for rescue  9. Phenergan caused insomnia. Compazine can be used instead as prior for rescue/ nausea with headaches    RTC in 6 months    "

## 2018-07-02 DIAGNOSIS — Z79.890 POSTMENOPAUSAL HRT (HORMONE REPLACEMENT THERAPY): ICD-10-CM

## 2018-07-02 RX ORDER — ESTRADIOL/NORETHINDRONE ACETATE TRANSDERMAL SYSTEM .05; .14 MG/D; MG/D
1 PATCH, EXTENDED RELEASE TRANSDERMAL
Qty: 8 PATCH | Refills: 0 | Status: SHIPPED | OUTPATIENT
Start: 2018-07-02 | End: 2018-08-02 | Stop reason: SDUPTHER

## 2018-07-02 NOTE — TELEPHONE ENCOUNTER
Tammie desire refill of Combipatch last annual 08/14/17    Patient Active Problem List   Diagnosis    Migraine without aura    Congenital anomaly of cerebrovascular system    Postmenopausal state    Arm numbness left    Transient global amnesia    Vitamin D deficiency    Neck pain     Prior to Admission medications    Medication Sig Start Date End Date Taking? Authorizing Provider   cholecalciferol, vitamin D3, (VITAMIN D3) 5,000 unit Tab Take 5,000 Units by mouth once daily.    Historical Provider, MD   ketorolac (TORADOL) 10 mg tablet Take 1 tablet (10 mg total) by mouth every 6 (six) hours as needed for Pain. 10/15/17   Camacho Wylie MD   sumatriptan (IMITREX) 100 MG tablet Take one daily PRN headache. May repeat dose once after 2 hours 5/8/18   Kit Steel MD

## 2018-08-02 DIAGNOSIS — Z79.890 POSTMENOPAUSAL HRT (HORMONE REPLACEMENT THERAPY): ICD-10-CM

## 2018-08-02 NOTE — TELEPHONE ENCOUNTER
Tammie desire refill of Combipatch. Annual scheduled.  Patient Active Problem List   Diagnosis    Migraine without aura    Congenital anomaly of cerebrovascular system    Postmenopausal state    Arm numbness left    Transient global amnesia    Vitamin D deficiency    Neck pain     Prior to Admission medications    Medication Sig Start Date End Date Taking? Authorizing Provider   cholecalciferol, vitamin D3, (VITAMIN D3) 5,000 unit Tab Take 5,000 Units by mouth once daily.    Historical Provider, MD   COMBIPATCH 0.05-0.14 mg/24 hr Place 1 patch onto the skin twice a week. 7/2/18   Dayan Duron MD   ketorolac (TORADOL) 10 mg tablet Take 1 tablet (10 mg total) by mouth every 6 (six) hours as needed for Pain. 10/15/17   Camacho Wylie MD   sumatriptan (IMITREX) 100 MG tablet Take one daily PRN headache. May repeat dose once after 2 hours 5/8/18   Kit Steel MD

## 2018-08-03 RX ORDER — ESTRADIOL/NORETHINDRONE ACETATE TRANSDERMAL SYSTEM .05; .14 MG/D; MG/D
1 PATCH, EXTENDED RELEASE TRANSDERMAL
Qty: 8 PATCH | Refills: 0 | Status: SHIPPED | OUTPATIENT
Start: 2018-08-06 | End: 2018-09-04 | Stop reason: SDUPTHER

## 2018-08-06 ENCOUNTER — TELEPHONE (OUTPATIENT)
Dept: OBSTETRICS AND GYNECOLOGY | Facility: CLINIC | Age: 63
End: 2018-08-06

## 2018-08-06 NOTE — TELEPHONE ENCOUNTER
Per pt's request Combipatch prescription was cancelled at [facility] in [place] and called in to [facility] in [place].  Prescription dated 8/6/18.

## 2018-09-04 DIAGNOSIS — Z79.890 POSTMENOPAUSAL HRT (HORMONE REPLACEMENT THERAPY): ICD-10-CM

## 2018-09-04 RX ORDER — ESTRADIOL/NORETHINDRONE ACETATE TRANSDERMAL SYSTEM .05; .14 MG/D; MG/D
1 PATCH, EXTENDED RELEASE TRANSDERMAL
Qty: 8 PATCH | Refills: 0 | Status: SHIPPED | OUTPATIENT
Start: 2018-09-06 | End: 2018-09-05 | Stop reason: SDUPTHER

## 2018-09-04 NOTE — TELEPHONE ENCOUNTER
Tammie desire refill of Combipatch. Annual scheduled.  Patient Active Problem List   Diagnosis    Migraine without aura    Congenital anomaly of cerebrovascular system    Postmenopausal state    Arm numbness left    Transient global amnesia    Vitamin D deficiency    Neck pain     Prior to Admission medications    Medication Sig Start Date End Date Taking? Authorizing Provider   cholecalciferol, vitamin D3, (VITAMIN D3) 5,000 unit Tab Take 5,000 Units by mouth once daily.    Historical Provider, MD   COMBIPATCH 0.05-0.14 mg/24 hr Place 1 patch onto the skin twice a week. 8/6/18   Dayan Duron MD   ketorolac (TORADOL) 10 mg tablet Take 1 tablet (10 mg total) by mouth every 6 (six) hours as needed for Pain. 10/15/17   Camacho Wylie MD   sumatriptan (IMITREX) 100 MG tablet Take one daily PRN headache. May repeat dose once after 2 hours 5/8/18   Kit Steel MD

## 2018-09-05 DIAGNOSIS — Z79.890 POSTMENOPAUSAL HRT (HORMONE REPLACEMENT THERAPY): ICD-10-CM

## 2018-09-05 RX ORDER — ESTRADIOL/NORETHINDRONE ACETATE TRANSDERMAL SYSTEM .05; .14 MG/D; MG/D
1 PATCH, EXTENDED RELEASE TRANSDERMAL
Qty: 8 PATCH | Refills: 0 | Status: SHIPPED | OUTPATIENT
Start: 2018-09-06 | End: 2018-09-06 | Stop reason: SDUPTHER

## 2018-09-05 NOTE — TELEPHONE ENCOUNTER
Tammie desire refill of Combipatch. Annual scheduled.  Patient Active Problem List   Diagnosis    Migraine without aura    Congenital anomaly of cerebrovascular system    Postmenopausal state    Arm numbness left    Transient global amnesia    Vitamin D deficiency    Neck pain     Prior to Admission medications    Medication Sig Start Date End Date Taking? Authorizing Provider   cholecalciferol, vitamin D3, (VITAMIN D3) 5,000 unit Tab Take 5,000 Units by mouth once daily.    Historical Provider, MD   COMBIPATCH 0.05-0.14 mg/24 hr Place 1 patch onto the skin twice a week. 9/6/18   Karley Mendoza MD   ketorolac (TORADOL) 10 mg tablet Take 1 tablet (10 mg total) by mouth every 6 (six) hours as needed for Pain. 10/15/17   Camacho Wylie MD   sumatriptan (IMITREX) 100 MG tablet Take one daily PRN headache. May repeat dose once after 2 hours 5/8/18   Kit Steel MD

## 2018-09-06 ENCOUNTER — OFFICE VISIT (OUTPATIENT)
Dept: OBSTETRICS AND GYNECOLOGY | Facility: CLINIC | Age: 63
End: 2018-09-06
Payer: COMMERCIAL

## 2018-09-06 VITALS
HEART RATE: 68 BPM | HEIGHT: 60 IN | WEIGHT: 128 LBS | DIASTOLIC BLOOD PRESSURE: 74 MMHG | SYSTOLIC BLOOD PRESSURE: 122 MMHG | BODY MASS INDEX: 25.13 KG/M2 | RESPIRATION RATE: 14 BRPM

## 2018-09-06 DIAGNOSIS — Z01.419 WELL WOMAN EXAM WITH ROUTINE GYNECOLOGICAL EXAM: Primary | ICD-10-CM

## 2018-09-06 DIAGNOSIS — Z78.0 POSTMENOPAUSAL STATUS: ICD-10-CM

## 2018-09-06 DIAGNOSIS — Z79.890 POSTMENOPAUSAL HRT (HORMONE REPLACEMENT THERAPY): ICD-10-CM

## 2018-09-06 PROCEDURE — 99396 PREV VISIT EST AGE 40-64: CPT | Mod: S$GLB,,, | Performed by: OBSTETRICS & GYNECOLOGY

## 2018-09-06 PROCEDURE — 99999 PR PBB SHADOW E&M-EST. PATIENT-LVL III: CPT | Mod: PBBFAC,,, | Performed by: OBSTETRICS & GYNECOLOGY

## 2018-09-06 RX ORDER — ESTRADIOL/NORETHINDRONE ACETATE TRANSDERMAL SYSTEM .05; .14 MG/D; MG/D
1 PATCH, EXTENDED RELEASE TRANSDERMAL
Qty: 8 PATCH | Refills: 11 | Status: SHIPPED | OUTPATIENT
Start: 2018-09-06 | End: 2018-09-06 | Stop reason: SDUPTHER

## 2018-09-06 RX ORDER — ESTRADIOL/NORETHINDRONE ACETATE TRANSDERMAL SYSTEM .05; .14 MG/D; MG/D
1 PATCH, EXTENDED RELEASE TRANSDERMAL
Qty: 8 PATCH | Refills: 11 | Status: SHIPPED | OUTPATIENT
Start: 2018-09-06 | End: 2020-05-21

## 2018-09-06 NOTE — PROGRESS NOTES
Subjective:    Patient ID: Tammie Sanders is a 62 y.o. female.     Chief Complaint: Annual Well Woman Exam     History of Present Illness:    Tammie presents today for Annual Well Woman exam.  .No LMP recorded. Patient is postmenopausal.. She is currently using Combipatch for HRT and desires to continue this.  She states it is controlling her symptoms well but she does not symptoms if she has not used her patch. She denies breast tenderness, masses, nipple discharge.  She declines mammogram.  She reports no problems with urination. Bowel movements have not significantly changed.  She declines colonoscopy.  She takes supplemental vitamin D.  She declines DEXA at this time but has had recent bone density evaluation that was reportedly normal.  She is current with health maintenance labs.  She has no complaints.    Past Medical History:   Diagnosis Date    Migraine headache      History reviewed. No pertinent surgical history.  Review of patient's allergies indicates:   Allergen Reactions    No known drug allergies      Current Outpatient Medications on File Prior to Visit   Medication Sig Dispense Refill    cholecalciferol, vitamin D3, (VITAMIN D3) 5,000 unit Tab Take 5,000 Units by mouth once daily.      sumatriptan (IMITREX) 100 MG tablet Take one daily PRN headache. May repeat dose once after 2 hours 9 tablet 11    [DISCONTINUED] COMBIPATCH 0.05-0.14 mg/24 hr Place 1 patch onto the skin twice a week. 8 patch 0    [DISCONTINUED] ketorolac (TORADOL) 10 mg tablet Take 1 tablet (10 mg total) by mouth every 6 (six) hours as needed for Pain. 15 tablet 0     No current facility-administered medications on file prior to visit.      Social History     Socioeconomic History    Marital status:      Spouse name: Not on file    Number of children: Not on file    Years of education: Not on file    Highest education level: Not on file   Social Needs    Financial resource strain: Not on file    Food insecurity  - worry: Not on file    Food insecurity - inability: Not on file    Transportation needs - medical: Not on file    Transportation needs - non-medical: Not on file   Occupational History    Not on file   Tobacco Use    Smoking status: Former Smoker     Packs/day: 1.00     Years: 25.00     Pack years: 25.00     Types: Cigarettes     Last attempt to quit: 1997     Years since quittin.6    Smokeless tobacco: Never Used   Substance and Sexual Activity    Alcohol use: Yes     Comment: occ    Drug use: No    Sexual activity: Yes     Partners: Male     Birth control/protection: Post-menopausal     Comment:    Other Topics Concern    Not on file   Social History Narrative    Not on file     Family History   Problem Relation Age of Onset    Migraines Daughter     Heart disease Mother     Cancer Father     Breast cancer Neg Hx     Colon cancer Neg Hx     Ovarian cancer Neg Hx      The following portions of the patient's history were reviewed and updated as appropriate: allergies, current medications, past family history, past medical history, past social history, past surgical history and problem list.    Menstrual History:   No LMP recorded. Patient is postmenopausal..     OB History      Para Term  AB Living    3 3            SAB TAB Ectopic Multiple Live Births                       Review of Systems   All other systems reviewed and are negative.          Objective:    Vital Signs:  Vitals:    18 1530   BP: 122/74   Pulse: 68   Resp: 14       Physical Exam:  General:  alert; oriented x 4; well-nourished female   Skin:  Skin color, texture, turgor normal. No rashes or lesions   HEENT:  conjunctivae/corneas clear..   Neck: supple, trachea midline   Respiratory:  clear to auscultation bilaterally   Heart:  regular rate and rhythm   Breasts:  Symmetrical; no palpable masses or lymphadenopathy; nipples protruding bilaterally; no discharge, erythema, or tenderness   Abdomen:   soft, non-tender; bowel sounds normal   Pelvis: External genitalia: normal general appearance  Urinary system: urethral meatus normal, bladder nontender  Vaginal: atrophic mucosa without prolapse or lesions  Cervix: normal appearance, nontender  Uterus: normal in size; nontender  Adnexa: nontender; no palpable masses   Extremities: Normal ROM; no edema, no cyanosis   Neurologial: Normal strength and tone. No focal numbness or weakness. Reflexes 2+ and equal.   Psychiatric: normal mood, speech, dress, and thought processes         Assessment:      1. Well woman exam with routine gynecological exam    2. Postmenopausal status    3. Postmenopausal HRT (hormone replacement therapy)          Plan:      Well woman exam with routine gynecological exam    Postmenopausal status    Postmenopausal HRT (hormone replacement therapy)  -     Discontinue: COMBIPATCH 0.05-0.14 mg/24 hr; Place 1 patch onto the skin twice a week.  Dispense: 8 patch; Refill: 11  -     COMBIPATCH 0.05-0.14 mg/24 hr; Place 1 patch onto the skin twice a week.  Dispense: 8 patch; Refill: 11        COUNSELING:  Tammie was counseled on HRT usage, A.C.O.G. Pap guidelines and recommendations for yearly pelvic exams in addition to recommendations for yearly mammograms and monthly self breast exams. In addition she was counseled on recommendations regarding colonoscopy and regarding adequate intake of calcium and vitamin D.  She is to see her PCP for other health maintenance.

## 2018-11-17 NOTE — PATIENT INSTRUCTIONS
Start Vitamin D3 5000IU over the counter 1 by mouth each day for Vitamin D deficiency.      17-Nov-2018 13:55

## 2019-03-25 ENCOUNTER — OFFICE VISIT (OUTPATIENT)
Dept: NEUROLOGY | Facility: CLINIC | Age: 64
End: 2019-03-25
Payer: COMMERCIAL

## 2019-03-25 VITALS
RESPIRATION RATE: 14 BRPM | BODY MASS INDEX: 25.58 KG/M2 | WEIGHT: 130.31 LBS | SYSTOLIC BLOOD PRESSURE: 122 MMHG | HEIGHT: 60 IN | HEART RATE: 72 BPM | DIASTOLIC BLOOD PRESSURE: 70 MMHG

## 2019-03-25 DIAGNOSIS — Z76.0 MEDICATION REFILL: ICD-10-CM

## 2019-03-25 DIAGNOSIS — F41.9 ANXIETY: ICD-10-CM

## 2019-03-25 DIAGNOSIS — G43.001 MIGRAINE WITHOUT AURA AND WITH STATUS MIGRAINOSUS, NOT INTRACTABLE: ICD-10-CM

## 2019-03-25 DIAGNOSIS — G56.03 BILATERAL CARPAL TUNNEL SYNDROME: Primary | ICD-10-CM

## 2019-03-25 PROCEDURE — 99214 PR OFFICE/OUTPT VISIT, EST, LEVL IV, 30-39 MIN: ICD-10-PCS | Mod: S$GLB,,, | Performed by: PSYCHIATRY & NEUROLOGY

## 2019-03-25 PROCEDURE — 99999 PR PBB SHADOW E&M-EST. PATIENT-LVL III: ICD-10-PCS | Mod: PBBFAC,,, | Performed by: PSYCHIATRY & NEUROLOGY

## 2019-03-25 PROCEDURE — 3008F BODY MASS INDEX DOCD: CPT | Mod: CPTII,S$GLB,, | Performed by: PSYCHIATRY & NEUROLOGY

## 2019-03-25 PROCEDURE — 99214 OFFICE O/P EST MOD 30 MIN: CPT | Mod: S$GLB,,, | Performed by: PSYCHIATRY & NEUROLOGY

## 2019-03-25 PROCEDURE — 99999 PR PBB SHADOW E&M-EST. PATIENT-LVL III: CPT | Mod: PBBFAC,,, | Performed by: PSYCHIATRY & NEUROLOGY

## 2019-03-25 PROCEDURE — 3008F PR BODY MASS INDEX (BMI) DOCUMENTED: ICD-10-PCS | Mod: CPTII,S$GLB,, | Performed by: PSYCHIATRY & NEUROLOGY

## 2019-03-25 RX ORDER — SUMATRIPTAN SUCCINATE 100 MG/1
TABLET ORAL
Qty: 9 TABLET | Refills: 11 | Status: SHIPPED | OUTPATIENT
Start: 2019-03-25 | End: 2020-05-21 | Stop reason: SDUPTHER

## 2019-03-25 NOTE — PROGRESS NOTES
HPI: Tammie Sanders is a 63 y.o. female with common migraine with rebound headaches at times and nausea. 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 Improved headaches with Topamax, but some dysesthesia/ numbness in the left arm. EMG showed Mild bilateral Carpal tunnel syndrome  In 2017, an episode of transient global amnesia. Her workup for TIA was overall unrevealing.     It has been nearly a year since the patient last saw me.  She reports her headaches are either once per week or non per month- this is tolerable and she uses imitrex.     CTS Symptoms are not currently very bothersome  Anxiety is stable. She does not want to be on any medication\  No more amnesia or TIA symptoms    Review of Systems   Constitutional: Negative for fever.   HENT: Negative for nosebleeds.    Eyes: Negative for double vision.   Respiratory: Negative for wheezing.    Cardiovascular: Negative for leg swelling.   Gastrointestinal: Negative for blood in stool.   Genitourinary: Negative for hematuria.   Musculoskeletal: Negative for falls.   Skin: Negative for rash.   Neurological: Positive for headaches.   Endo/Heme/Allergies: Negative for polydipsia.   Psychiatric/Behavioral: The patient does not have insomnia.            Objective:      Physical Exam      Gen Appearance, well developed/nourished in no apparent distress  CV: 2+ distal pulses with no edema or swelling  Neuro:  MS: Awake, alert,  Sustains attention. Recent/remote memory intact, Language is full to spontaneous speech/comprehension. Fund of Knowledge is full  CN: Optic discs are flat with normal vasculature, PERRL, Extraoccular movements and visual fields are full. Normal facial strength, Tongue and Palate are midline and strong. Shoulder Shrug symmetric and strong.  Motor: Normal bulk, tone, no abnormal movements. 5/5 strength bilateral upper/lower extremities with 2+ reflexes  Sensory:  Romberg  "negative and sensation intact to light touch, vibration, and pain  Cerebellar: Finger-nose,  Rapid alternating movements intact  Gait: Normal stance, no ataxia    EEG: normal 2017    CT C spine 10/2017: At C5-6 there is spondylosis and bilateral neural foraminal stenosis, right greater than left, with unchanged from prior MR of February 11, 2014. Spinal canal stenosis is not seen.    CT head 10/2017: Negative Head CT.      EMG 2017: Mild bilateral CTS  Assessment:         Tammie Sanders is a 63 y.o. female with common migraine with rebound headaches at times and nausea. 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 Improved headaches with Topamax, but some dysesthesia/ numbness in the left arm. EMG showed Mild bilateral Carpal tunnel syndrome  In 2017, an episode of transient global amnesia. Her workup for TIA was overall unrevealing. Repeat EMG in 2017 showed mild bilateral CTS  Plan:   1. Botox did help but she had far too much out of pocket expense. Currently, her headaches are far better controlled. Verapamil did not help prior. She has failed Topamax and previously had not relief with Elavil and propranol  -CGRP antagonist can be considered at any point if needed/ if headaches worsens.   -Imitrex to continue PRN with Fioricet for rescue. Phenergan caused insomnia. Compazine can be used instead as prior for rescue/ nausea with headaches  2. CTS have responded to brace wearing. Could see ortho if worse at any point advised.   3. MRI C spine /CT head reassuring some mild changes in the C spine, but no spinal stenosis  4. Her PCP recommended anxiety treatment- I suggested she consider this as well, but she reports benefits are not worth her intolerance of these meds currently  5. No specific treatment needed per neurosurgery for " cavernous malformation vs telangiectasia"      RTC 1 year    "

## 2020-05-21 ENCOUNTER — OFFICE VISIT (OUTPATIENT)
Dept: NEUROLOGY | Facility: CLINIC | Age: 65
End: 2020-05-21
Payer: COMMERCIAL

## 2020-05-21 VITALS
RESPIRATION RATE: 16 BRPM | DIASTOLIC BLOOD PRESSURE: 80 MMHG | WEIGHT: 127.44 LBS | BODY MASS INDEX: 25.02 KG/M2 | HEIGHT: 60 IN | HEART RATE: 66 BPM | SYSTOLIC BLOOD PRESSURE: 124 MMHG | TEMPERATURE: 98 F | OXYGEN SATURATION: 98 %

## 2020-05-21 DIAGNOSIS — G43.001 MIGRAINE WITHOUT AURA AND WITH STATUS MIGRAINOSUS, NOT INTRACTABLE: Primary | ICD-10-CM

## 2020-05-21 DIAGNOSIS — F41.9 ANXIETY: ICD-10-CM

## 2020-05-21 DIAGNOSIS — G56.03 BILATERAL CARPAL TUNNEL SYNDROME: ICD-10-CM

## 2020-05-21 DIAGNOSIS — Z76.0 MEDICATION REFILL: ICD-10-CM

## 2020-05-21 PROCEDURE — 99999 PR PBB SHADOW E&M-EST. PATIENT-LVL III: ICD-10-PCS | Mod: PBBFAC,,, | Performed by: PSYCHIATRY & NEUROLOGY

## 2020-05-21 PROCEDURE — 99214 OFFICE O/P EST MOD 30 MIN: CPT | Mod: S$GLB,,, | Performed by: PSYCHIATRY & NEUROLOGY

## 2020-05-21 PROCEDURE — 99999 PR PBB SHADOW E&M-EST. PATIENT-LVL III: CPT | Mod: PBBFAC,,, | Performed by: PSYCHIATRY & NEUROLOGY

## 2020-05-21 PROCEDURE — 3008F PR BODY MASS INDEX (BMI) DOCUMENTED: ICD-10-PCS | Mod: CPTII,S$GLB,, | Performed by: PSYCHIATRY & NEUROLOGY

## 2020-05-21 PROCEDURE — 3008F BODY MASS INDEX DOCD: CPT | Mod: CPTII,S$GLB,, | Performed by: PSYCHIATRY & NEUROLOGY

## 2020-05-21 PROCEDURE — 99214 PR OFFICE/OUTPT VISIT, EST, LEVL IV, 30-39 MIN: ICD-10-PCS | Mod: S$GLB,,, | Performed by: PSYCHIATRY & NEUROLOGY

## 2020-05-21 RX ORDER — BUSPIRONE HYDROCHLORIDE 10 MG/1
10 TABLET ORAL DAILY
COMMUNITY
End: 2022-04-27

## 2020-05-21 RX ORDER — VITAMIN B COMPLEX
1 CAPSULE ORAL DAILY
COMMUNITY
End: 2021-08-05

## 2020-05-21 RX ORDER — SUMATRIPTAN SUCCINATE 100 MG/1
TABLET ORAL
Qty: 9 TABLET | Refills: 11 | Status: SHIPPED | OUTPATIENT
Start: 2020-05-21 | End: 2022-12-14 | Stop reason: SDUPTHER

## 2020-05-21 NOTE — PROGRESS NOTES
HPI: Tammie Sanders is a 64 y.o. female with common migraine with rebound headaches at times and nausea. 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 Improved headaches with Topamax, but some dysesthesia/ numbness in the left arm. EMG showed Mild bilateral Carpal tunnel syndrome  In 2017, an episode of transient global amnesia. Her workup for TIA was overall unrevealing.     Patient is here for her yearly follow up  Headaches still 0-4 per month?--this is tolerable and she uses imitrex with good relief  She knows her triggers are sleep deprivation.   CTS Symptoms are bothersome at times with certain activities.   No consistent  neck pain  Anxiety is being treated by [facility]  No more amnesia or TIA symptoms    Review of Systems   Constitutional: Negative for fever.   HENT: Negative for nosebleeds.    Eyes: Negative for double vision.   Respiratory: Negative for wheezing.    Cardiovascular: Negative for leg swelling.   Gastrointestinal: Negative for blood in stool.   Genitourinary: Negative for hematuria.   Musculoskeletal: Negative for falls.   Skin: Negative for rash.   Neurological: Positive for headaches.   Endo/Heme/Allergies: Negative for polydipsia.   Psychiatric/Behavioral: The patient does not have insomnia.            Objective:      Physical Exam      Gen Appearance, well developed/nourished in no apparent distress  CV: 2+ distal pulses with no edema or swelling  Neuro:  MS: Awake, alert,  Sustains attention. Recent/remote memory intact, Language is full to spontaneous speech/comprehension. Fund of Knowledge is full  CN: Optic discs are flat with normal vasculature, PERRL, Extraoccular movements and visual fields are full. Normal facial strength, Tongue and Palate are midline and strong. Shoulder Shrug symmetric and strong.  Motor: Normal bulk, tone, no abnormal movements. 5/5 strength bilateral upper/lower extremities with 2+  "reflexes  Sensory:  Romberg negative and sensation intact to light touch, vibration, and pain  Cerebellar: Finger-nose,  Rapid alternating movements intact  Gait: Normal stance, no ataxia    EEG: normal 2017    CT C spine 10/2017: At C5-6 there is spondylosis and bilateral neural foraminal stenosis, right greater than left, with unchanged from prior MR of February 11, 2014. Spinal canal stenosis is not seen.    CT head 10/2017: Negative Head CT.      EMG 2017: Mild bilateral CTS      Assessment:         Tammie Sanders is a 64 y.o. female with common migraine with rebound headaches at times and nausea. 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 Improved headaches with Topamax, but some dysesthesia/ numbness in the left arm. EMG showed Mild bilateral Carpal tunnel syndrome  In 2017, an episode of transient global amnesia. Her workup for TIA was overall unrevealing. Repeat EMG in 2017 showed mild bilateral CTS  Plan:   1. Botox did help but she had far too much out of pocket expense. Currently, her headaches are far better controlled. Verapamil did not help prior. She has failed Topamax and previously had not relief with Elavil and propranol  -CGRP antagonist can be considered at any point if needed/ if headaches worsens.   -Imitrex to continue PRN and not currently needing Fioricet for rescue. Phenergan caused insomnia prior. Compazine can be used instead as prior for rescue/ nausea with headaches if needed  2. CTS not fully responsive brace wearing. Ortho consult advised but states she will try home exercises first.   3. MRI C spine /CT head reassuring some mild changes in the C spine 2017, but no spinal stenosis  4. Her PCP recommended anxiety treatment prior and now she is being treated by [facility] for this  5. No specific treatment needed per neurosurgery for " cavernous malformation vs " "telangiectasia"      RTC 1 year.     "

## 2020-10-12 ENCOUNTER — TELEPHONE (OUTPATIENT)
Dept: NEUROLOGY | Facility: CLINIC | Age: 65
End: 2020-10-12

## 2020-10-12 DIAGNOSIS — G56.03 BILATERAL CARPAL TUNNEL SYNDROME: Primary | ICD-10-CM

## 2020-10-12 NOTE — TELEPHONE ENCOUNTER
----- Message from Yasmin Cazares sent at 10/12/2020  3:59 PM CDT -----  Contact: PATIENT  Tammie Sanders  MRN: [id number]  : 1955  PCP: Ignacia Chan  Home Phone      [phone]  Work Phone      Not on file.  Mobile          [phone]      MESSAGE: Patient states that is was discussed at her last office visit that when she would be ready for treatment for CTS that Dr. Steel would refer her to someone that specializes in CTS.  She states that she is ready for the referral.          Phone: [phone]

## 2020-11-05 DIAGNOSIS — M79.641 BILATERAL HAND PAIN: Primary | ICD-10-CM

## 2020-11-05 DIAGNOSIS — M79.642 BILATERAL HAND PAIN: Primary | ICD-10-CM

## 2020-11-05 NOTE — PROGRESS NOTES
Subjective:      Patient ID: Tammie Sanders is a 64 y.o. female.    Chief Complaint: Carpal Tunnel (Referral from Dr Kit Steel)    Review of patient's allergies indicates:   Allergen Reactions    No known drug allergies       65 yo RHD F presents to clinic with c/o intermittent bilateral hand/wrist pain/numbness/tingling x years.  Generalized hand/wrist pain, described as sharp.  She had EMG in 2017 that showed mild bilateral carpal tunnel syndrome.  She has wrist braces that she sometimes wears at night.  Symptoms have been worsening recently, especially at night and when she wakes up in the morning.  She is requesting carpal tunnel injections today.      Review of Systems   Constitution: Negative for chills, diaphoresis and fever.   HENT: Negative for congestion, ear discharge and ear pain.    Eyes: Negative for blurred vision, discharge, double vision and pain.   Cardiovascular: Negative for chest pain, claudication and cyanosis.   Respiratory: Negative for cough, hemoptysis and shortness of breath.    Endocrine: Negative for cold intolerance and heat intolerance.   Skin: Negative for color change, dry skin, itching and rash.   Musculoskeletal: Positive for joint pain. Negative for arthritis, back pain, falls, gout, joint swelling, muscle weakness and neck pain.   Gastrointestinal: Negative for abdominal pain and change in bowel habit.   Neurological: Positive for numbness and paresthesias. Negative for brief paralysis, disturbances in coordination and dizziness.   Psychiatric/Behavioral: Negative for altered mental status and depression.         Objective:          General    Constitutional: She is oriented to person, place, and time. She appears well-developed and well-nourished. No distress.   HENT:   Head: Atraumatic.   Eyes: EOM are normal. Right eye exhibits no discharge. Left eye exhibits no discharge.   Cardiovascular: Normal rate.    Pulmonary/Chest: Effort normal. No respiratory distress.    Abdominal: Soft.   Neurological: She is alert and oriented to person, place, and time.   Psychiatric: She has a normal mood and affect. Her behavior is normal.             Right Hand/Wrist Exam     Inspection   Scars: Wrist - absent Hand -  absent  Effusion: Wrist - absent Hand -  absent  Bruising: Wrist - absent Hand -  absent  Deformity: Wrist - deformity Hand -  deformity    Range of Motion     Wrist   Extension: normal   Flexion: normal   Pronation: normal   Supination: normal     Tests   Tinel's sign (median nerve): positive  Finkelstein's test: negative  Carpal Tunnel Compression Test: positive  Cubital Tunnel Compression Test: negative      Other     Neuorologic Exam    Median Distribution: normal  Ulnar Distribution: normal  Radial Distribution: normal    Comments:  Generalized hand pain      Left Hand/Wrist Exam     Inspection   Scars: Wrist - absent Hand -  absent  Effusion: Wrist - absent Hand -  absent  Bruising: Wrist - absent Hand -  absent  Deformity: Wrist - absent Hand -  absent    Range of Motion     Wrist   Extension: normal   Flexion: normal   Pronation: normal   Supination: normal     Tests   Tinel's sign (median nerve): negative  Finkelstein's test: negative  Carpal Tunnel Compression Test: positive  Cubital Tunnel Compression Test: negative      Other     Sensory Exam  Median Distribution: normal  Ulnar Distribution: normal  Radial Distribution: normal    Comments:  Generalized hand pain      Right Elbow Exam     Tests   Tinel's sign (cubital tunnel): negative      Left Elbow Exam     Tests   Tinel's sign (cubital tunnel): positive        Muscle Strength   Right Upper Extremity   Wrist extension: 5/5   Wrist flexion: 5/5   : 5/5   Left Upper Extremity  Wrist extension: 5/5   Wrist flexion: 5/5   :  5/5     Vascular Exam       Capillary Refill  Right Hand: normal capillary refill  Left Hand: normal capillary refill              Assessment:         Xray Bilateral Hands 11/6/20:  No  significant focal degenerative change.  No evidence of erosive arthropathy.     There is no evidence of fracture, dislocation or other acute osseous abnormality. No focal soft tissue abnormality.    EMG Bilateral Upper Extremities 11/2/17:  Abnormal Study secondary to the Presence of:     Mild bilateral Carpal tunnel syndrome  No evidence of cervical radiculopathy on  this exam. This is exam is largely unchanged from 2/2014 study      Encounter Diagnoses   Name Primary?    Bilateral carpal tunnel syndrome Yes    Bilateral hand pain     Bilateral carpal tunnel syndrome  -     lidocaine HCL 10 mg/ml (1%) injection 1 mL  -     lidocaine HCL 10 mg/ml (1%) injection 1 mL  -     dexamethasone injection 4 mg  -     dexamethasone injection 4 mg    Bilateral hand pain               Plan:         I made the decision to obtain old records of the patient including previous notes and imaging. New imaging was ordered today of the extremity or extremities evaluated. I independently reviewed and interpreted the radiographs today as well as prior imaging.    The total face-to-face encounter time with this patient was 35 minutes and greater than 50% of of the encounter time was spent counseling the patient, coordinating care, and education regarding the pathology of his/her diagnosis. We have discussed a variety of treatment options including medications, bracing, nerve glide exercises, injections, occupational therapy and surgery. Pt is requesting bilateral carpal tunnel injections today.  Today, the patient chooses  a CSI and understands a minimal of 3 months time must lapse after injection, prior to a surgical procedure due to increased risk of infection.     1. PROCEDURE:  I have explained the risks, benefits, and alternatives of the procedure in detail.  The patient voices understanding and all questions have been answered.  The patient agrees to proceed as planned. The palmaris longus tendon was identified and marked and so  was the distal wrist crease After I performed a sterile prep of the skin in the normal fashion the bilateral carpal tunnel is injected from the volar approach using a 25 gauge needle with a combination of 1cc 1% plain xylocaine and 4 mg of dexamethasone.  The patient is cautioned and immediate relief of pain is secondary to the local anesthetic and will be temporary.  After the anesthetic wears off there may be a increase in pain that may last for a few hours or a few days and they should use ice to help alleviate this flair up of pain. Patient tolerated the procedure well. The patient has been asked to report to us any redness, swelling, inflammation, or fevers. The patient has been asked to restrict the use of the bilateral upper extremities for the next 24 hours.     2. Mobic 15 mg 1 time daily PRN for pain management. Patient understands to take with food and/or OTC prilosec to decrease GI side effects.  3. HEP 89125 - I instructed and demonstrated a carpal tunnel nerve glide HEP. The patient then demonstrated understanding of exercises and proper technique. This program was performed for 10 minutes.   4. Wrist braces to be worn while sleeping and as needed during the day.  76377 - I performed a custom orthotic / brace adjustment, fitting and training with the patient. The patient demonstrated understanding and proper care. This was performed for 10 minutes.  5. Ice compress to the affected area 2-3x a day for 15-20 minutes as needed for pain management.  6. RTC in 6 weeks for follow-up, sooner if needed.      Patient voices understanding of and agreement with treatment plan. All of the patient's questions were answered and the patient will contact us if she has any questions or concerns in the interim.

## 2020-11-06 ENCOUNTER — HOSPITAL ENCOUNTER (OUTPATIENT)
Dept: RADIOLOGY | Facility: HOSPITAL | Age: 65
Discharge: HOME | End: 2020-11-06
Attending: PHYSICIAN ASSISTANT
Payer: COMMERCIAL

## 2020-11-06 ENCOUNTER — OFFICE VISIT (OUTPATIENT)
Dept: ORTHOPEDICS | Facility: CLINIC | Age: 65
End: 2020-11-06
Payer: COMMERCIAL

## 2020-11-06 VITALS
WEIGHT: 129.63 LBS | DIASTOLIC BLOOD PRESSURE: 79 MMHG | HEIGHT: 60 IN | TEMPERATURE: 98 F | RESPIRATION RATE: 14 BRPM | SYSTOLIC BLOOD PRESSURE: 129 MMHG | BODY MASS INDEX: 25.45 KG/M2 | HEART RATE: 83 BPM

## 2020-11-06 DIAGNOSIS — M79.641 BILATERAL HAND PAIN: ICD-10-CM

## 2020-11-06 DIAGNOSIS — M79.642 BILATERAL HAND PAIN: ICD-10-CM

## 2020-11-06 DIAGNOSIS — G56.03 BILATERAL CARPAL TUNNEL SYNDROME: Primary | ICD-10-CM

## 2020-11-06 PROCEDURE — 20526 THER INJECTION CARP TUNNEL: CPT | Mod: 50,S$GLB,, | Performed by: PHYSICIAN ASSISTANT

## 2020-11-06 PROCEDURE — 20526 PR INJECT CARPAL TUNNEL: ICD-10-PCS | Mod: 50,S$GLB,, | Performed by: PHYSICIAN ASSISTANT

## 2020-11-06 PROCEDURE — 73130 XR HAND COMPLETE 3 VIEWS BILATERAL: ICD-10-PCS | Mod: 26,,, | Performed by: RADIOLOGY

## 2020-11-06 PROCEDURE — 73130 X-RAY EXAM OF HAND: CPT | Mod: 26,,, | Performed by: RADIOLOGY

## 2020-11-06 PROCEDURE — 99203 OFFICE O/P NEW LOW 30 MIN: CPT | Mod: 25,S$GLB,, | Performed by: PHYSICIAN ASSISTANT

## 2020-11-06 PROCEDURE — 99999 PR PBB SHADOW E&M-EST. PATIENT-LVL IV: ICD-10-PCS | Mod: PBBFAC,,, | Performed by: PHYSICIAN ASSISTANT

## 2020-11-06 PROCEDURE — 73130 X-RAY EXAM OF HAND: CPT | Mod: TC,50

## 2020-11-06 PROCEDURE — 3008F PR BODY MASS INDEX (BMI) DOCUMENTED: ICD-10-PCS | Mod: CPTII,S$GLB,, | Performed by: PHYSICIAN ASSISTANT

## 2020-11-06 PROCEDURE — 99999 PR PBB SHADOW E&M-EST. PATIENT-LVL IV: CPT | Mod: PBBFAC,,, | Performed by: PHYSICIAN ASSISTANT

## 2020-11-06 PROCEDURE — 99203 PR OFFICE/OUTPT VISIT, NEW, LEVL III, 30-44 MIN: ICD-10-PCS | Mod: 25,S$GLB,, | Performed by: PHYSICIAN ASSISTANT

## 2020-11-06 PROCEDURE — 3008F BODY MASS INDEX DOCD: CPT | Mod: CPTII,S$GLB,, | Performed by: PHYSICIAN ASSISTANT

## 2020-11-06 RX ORDER — DEXAMETHASONE SODIUM PHOSPHATE 4 MG/ML
4 INJECTION, SOLUTION INTRA-ARTICULAR; INTRALESIONAL; INTRAMUSCULAR; INTRAVENOUS; SOFT TISSUE ONCE
Status: COMPLETED | OUTPATIENT
Start: 2020-11-06 | End: 2020-11-06

## 2020-11-06 RX ORDER — ATORVASTATIN CALCIUM 20 MG/1
20 TABLET, FILM COATED ORAL DAILY
COMMUNITY
Start: 2020-11-02

## 2020-11-06 RX ORDER — LIDOCAINE HYDROCHLORIDE 10 MG/ML
1 INJECTION INFILTRATION; PERINEURAL
Status: COMPLETED | OUTPATIENT
Start: 2020-11-06 | End: 2020-11-06

## 2020-11-06 RX ORDER — HYDROXYZINE PAMOATE 25 MG/1
50 CAPSULE ORAL NIGHTLY PRN
COMMUNITY
Start: 2020-11-02 | End: 2021-08-05

## 2020-11-06 RX ADMIN — DEXAMETHASONE SODIUM PHOSPHATE 4 MG: 4 INJECTION, SOLUTION INTRA-ARTICULAR; INTRALESIONAL; INTRAMUSCULAR; INTRAVENOUS; SOFT TISSUE at 02:11

## 2020-11-06 RX ADMIN — LIDOCAINE HYDROCHLORIDE 1 ML: 10 INJECTION INFILTRATION; PERINEURAL at 02:11

## 2020-11-06 NOTE — LETTER
November 6, 2020      Kit Steel MD  [address]  [facility] [address]           [facility]  [address] [facility] [address]  Phone: [phone]          Patient: Tammie Sanders   MR Number: [id number]   YOB: 1955   Date of Visit: 11/6/2020       Dear Dr. Kti Steel:    Thank you for referring Tammie Sanders to me for evaluation. Attached you will find relevant portions of my assessment and plan of care.    If you have questions, please do not hesitate to call me. I look forward to following Tammie Sanders along with you.    Sincerely,    Jeana Regan PA-C    Enclosure  CC:  No Recipients    If you would like to receive this communication electronically, please contact [email or web address] or [phone] to request more information on [employer] Link access.    For providers and/or their staff who would like to refer a patient to [facility], please contact us through our one-stop-shop provider referral line, [facility], at [phone].    If you feel you have received this communication in error or would no longer like to receive these types of communications, please e-mail [email or web address]

## 2020-12-18 ENCOUNTER — OFFICE VISIT (OUTPATIENT)
Dept: ORTHOPEDICS | Facility: CLINIC | Age: 65
End: 2020-12-18
Payer: COMMERCIAL

## 2020-12-18 VITALS
DIASTOLIC BLOOD PRESSURE: 76 MMHG | HEART RATE: 72 BPM | OXYGEN SATURATION: 98 % | HEIGHT: 60 IN | WEIGHT: 132.25 LBS | TEMPERATURE: 98 F | RESPIRATION RATE: 16 BRPM | BODY MASS INDEX: 25.97 KG/M2 | SYSTOLIC BLOOD PRESSURE: 124 MMHG

## 2020-12-18 DIAGNOSIS — G56.03 BILATERAL CARPAL TUNNEL SYNDROME: Primary | ICD-10-CM

## 2020-12-18 PROCEDURE — 1101F PR PT FALLS ASSESS DOC 0-1 FALLS W/OUT INJ PAST YR: ICD-10-PCS | Mod: CPTII,S$GLB,, | Performed by: PHYSICIAN ASSISTANT

## 2020-12-18 PROCEDURE — 99999 PR PBB SHADOW E&M-EST. PATIENT-LVL IV: ICD-10-PCS | Mod: PBBFAC,,, | Performed by: PHYSICIAN ASSISTANT

## 2020-12-18 PROCEDURE — 99213 OFFICE O/P EST LOW 20 MIN: CPT | Mod: S$GLB,,, | Performed by: PHYSICIAN ASSISTANT

## 2020-12-18 PROCEDURE — 99999 PR PBB SHADOW E&M-EST. PATIENT-LVL IV: CPT | Mod: PBBFAC,,, | Performed by: PHYSICIAN ASSISTANT

## 2020-12-18 PROCEDURE — 3288F PR FALLS RISK ASSESSMENT DOCUMENTED: ICD-10-PCS | Mod: CPTII,S$GLB,, | Performed by: PHYSICIAN ASSISTANT

## 2020-12-18 PROCEDURE — 99213 PR OFFICE/OUTPT VISIT, EST, LEVL III, 20-29 MIN: ICD-10-PCS | Mod: S$GLB,,, | Performed by: PHYSICIAN ASSISTANT

## 2020-12-18 PROCEDURE — 1126F PR PAIN SEVERITY QUANTIFIED, NO PAIN PRESENT: ICD-10-PCS | Mod: S$GLB,,, | Performed by: PHYSICIAN ASSISTANT

## 2020-12-18 PROCEDURE — 3288F FALL RISK ASSESSMENT DOCD: CPT | Mod: CPTII,S$GLB,, | Performed by: PHYSICIAN ASSISTANT

## 2020-12-18 PROCEDURE — 1126F AMNT PAIN NOTED NONE PRSNT: CPT | Mod: S$GLB,,, | Performed by: PHYSICIAN ASSISTANT

## 2020-12-18 PROCEDURE — 1101F PT FALLS ASSESS-DOCD LE1/YR: CPT | Mod: CPTII,S$GLB,, | Performed by: PHYSICIAN ASSISTANT

## 2020-12-18 PROCEDURE — 3008F BODY MASS INDEX DOCD: CPT | Mod: CPTII,S$GLB,, | Performed by: PHYSICIAN ASSISTANT

## 2020-12-18 PROCEDURE — 3008F PR BODY MASS INDEX (BMI) DOCUMENTED: ICD-10-PCS | Mod: CPTII,S$GLB,, | Performed by: PHYSICIAN ASSISTANT

## 2020-12-18 RX ORDER — UBIDECARENONE 30 MG
30 CAPSULE ORAL DAILY
COMMUNITY

## 2020-12-18 RX ORDER — VITAMIN A 3000 MCG
10000 CAPSULE ORAL DAILY
COMMUNITY

## 2020-12-18 RX ORDER — MELOXICAM 7.5 MG/1
7.5 TABLET ORAL DAILY
Qty: 30 TABLET | Refills: 0 | Status: SHIPPED | OUTPATIENT
Start: 2020-12-18 | End: 2021-01-15

## 2020-12-18 NOTE — PROGRESS NOTES
Subjective:      Patient ID: Tammie Sanders is a 65 y.o. female.    Chief Complaint: Carpal Tunnel (bilateral )    Review of patient's allergies indicates:   Allergen Reactions    No known drug allergies       Interval:  Patient returns to clinic for follow up of bilateral carpal tunnel syndrome.  She states that the injections at last visit provided good relief of symptoms for about a month.  Symptoms returned to right hand recently, not as severe as before injection.    HPI 11/6/20:  63 yo RHD F presents to clinic with c/o intermittent bilateral hand/wrist pain/numbness/tingling x years.  Generalized hand/wrist pain, described as sharp.  She had EMG in 2017 that showed mild bilateral carpal tunnel syndrome.  She has wrist braces that she sometimes wears at night.  Symptoms have been worsening recently, especially at night and when she wakes up in the morning.  She is requesting carpal tunnel injections today.      Review of Systems   Constitution: Negative for chills, diaphoresis and fever.   HENT: Negative for congestion, ear discharge and ear pain.    Eyes: Negative for blurred vision, discharge, double vision and pain.   Cardiovascular: Negative for chest pain, claudication and cyanosis.   Respiratory: Negative for cough, hemoptysis and shortness of breath.    Endocrine: Negative for cold intolerance and heat intolerance.   Skin: Negative for color change, dry skin, itching and rash.   Musculoskeletal: Positive for joint pain. Negative for arthritis, back pain, falls, gout, joint swelling, muscle weakness and neck pain.   Gastrointestinal: Negative for abdominal pain and change in bowel habit.   Neurological: Positive for numbness and paresthesias. Negative for brief paralysis, disturbances in coordination and dizziness.   Psychiatric/Behavioral: Negative for altered mental status and depression.         Objective:          General    Constitutional: She is oriented to person, place, and time. She appears  well-developed and well-nourished. No distress.   HENT:   Head: Atraumatic.   Eyes: EOM are normal. Right eye exhibits no discharge. Left eye exhibits no discharge.   Cardiovascular: Normal rate.    Pulmonary/Chest: Effort normal. No respiratory distress.   Abdominal: Soft.   Neurological: She is alert and oriented to person, place, and time.   Psychiatric: She has a normal mood and affect. Her behavior is normal.             Right Hand/Wrist Exam     Inspection   Scars: Wrist - absent Hand -  absent  Effusion: Wrist - absent Hand -  absent  Bruising: Wrist - absent Hand -  absent  Deformity: Wrist - deformity Hand -  deformity    Range of Motion     Wrist   Extension: normal   Flexion: normal   Pronation: normal   Supination: normal     Tests   Tinel's sign (median nerve): positive  Finkelstein's test: negative  Carpal Tunnel Compression Test: positive  Cubital Tunnel Compression Test: negative      Other     Neuorologic Exam    Median Distribution: normal  Ulnar Distribution: normal  Radial Distribution: normal    Comments:  Generalized hand pain      Left Hand/Wrist Exam     Inspection   Scars: Wrist - absent Hand -  absent  Effusion: Wrist - absent Hand -  absent  Bruising: Wrist - absent Hand -  absent  Deformity: Wrist - absent Hand -  absent    Range of Motion     Wrist   Extension: normal   Flexion: normal   Pronation: normal   Supination: normal     Tests   Tinel's sign (median nerve): negative  Finkelstein's test: negative  Carpal Tunnel Compression Test: positive  Cubital Tunnel Compression Test: negative      Other     Sensory Exam  Median Distribution: normal  Ulnar Distribution: normal  Radial Distribution: normal    Comments:  Generalized hand pain      Right Elbow Exam     Tests   Tinel's sign (cubital tunnel): negative      Left Elbow Exam     Tests   Tinel's sign (cubital tunnel): positive        Muscle Strength   Right Upper Extremity   Wrist extension: 5/5   Wrist flexion: 5/5   : 5/5   Left  Upper Extremity  Wrist extension: 5/5   Wrist flexion: 5/5   :  5/5     Vascular Exam       Capillary Refill  Right Hand: normal capillary refill  Left Hand: normal capillary refill              Assessment:         Xray Bilateral Hands 11/6/20:  No significant focal degenerative change.  No evidence of erosive arthropathy.     There is no evidence of fracture, dislocation or other acute osseous abnormality. No focal soft tissue abnormality.    EMG Bilateral Upper Extremities 11/2/17:  Abnormal Study secondary to the Presence of:     Mild bilateral Carpal tunnel syndrome  No evidence of cervical radiculopathy on  this exam. This is exam is largely unchanged from 2/2014 study      Encounter Diagnosis   Name Primary?    Bilateral carpal tunnel syndrome Yes    Bilateral carpal tunnel syndrome               Plan:         I made the decision to obtain old records of the patient including previous notes and imaging. New imaging was ordered today of the extremity or extremities evaluated. I independently reviewed and interpreted the radiographs today as well as prior imaging.    The total face-to-face encounter time with this patient was 35 minutes and greater than 50% of of the encounter time was spent counseling the patient, coordinating care, and education regarding the pathology of his/her diagnosis. We have discussed a variety of treatment options including medications, bracing, nerve glide exercises, injections, occupational therapy and surgery. Patient wishes to consider CTR. She will follow up with Dr. Mcelroy to discuss surgery but would like to wait until after deer hunting season. She would also like to try an antiinflammatory.    1. Follow up with Dr. Mcelroy to discuss CTR.  2. Mobic 7.5 mg 1 time daily PRN for pain management. Patient understands to take with food and/or OTC prilosec to decrease GI side effects.  3. Continue nerve glide exercises as demonstrated at previous visit.  4. Continue wrist  braces to be worn while sleeping and as needed during the day.  5. Ice compress to the affected area 2-3x a day for 15-20 minutes as needed for pain management.  6. RTC for follow up with Dr. Mcelroy on 1/28, sooner if needed.      Patient voices understanding of and agreement with treatment plan. All of the patient's questions were answered and the patient will contact us if she has any questions or concerns in the interim.

## 2020-12-18 NOTE — PATIENT INSTRUCTIONS
Carpal Tunnel Syndrome    Carpal tunnel syndrome is a painful condition of the wrist and arm. It is caused by pressure on the median nerve.  The median nerve is one of the nerves that give feeling and movement to the hand. It passes through a tunnel in the wrist called the carpal tunnel. This tunnel is made up of bones and ligaments. Narrowing of this tunnel or swelling of the tissues inside the tunnel puts pressure on the median nerve. This causes numbness, pins and needles, or electric shooting pains in your hand and forearm. Often the pain is worse at night and may wake you when you are asleep.  Carpal tunnel syndrome may occur during pregnancy and with use of birth control pills. It is more common in workers who must often bend their wrists. It is also common in people who work with power tools that cause strong vibrations.  Home care  · Rest the painful wrist. Avoid repeated bending of the wrist back and forth. This puts pressure on the median nerve. Avoid using power tools with strong vibrations.  · If you were given a splint, wear it at night while you sleep. You may also wear it during the day for comfort.  · Move your fingers and wrists often to avoid stiffness.  · Elevate your arms on pillows when you lie down.  · Try using the unaffected hand more.  · Try not to hold your wrists in a bent, downward position.  · Sometimes changes in the work place may ease symptoms. If you type most of the day, it may help to change the position of your keyboard or add a wrist support. Your wrist should be in a neutral position and not bent back when typing.  · You may use over-the-counter pain medicine to treat pain and inflammation, unless another medicine was prescribed. Anti-inflammatory pain medicines, such as ibuprofen or naproxen may be more effective than acetaminophen, which treats pain, but not inflammation. If you have chronic liver or kidney disease or ever had a stomach ulcer or GI bleeding, talk with your  doctor before using these medicines.  · Opioid pain medicine will only give temporary relief and does not treat the problem. If pain continues, you may need a shot of a steroid drug into your wrist.  · If the above methods fail, you may need surgery. This will open the carpal tunnel and release the pressure on the trapped nerve.  Follow-up care  Follow up with your healthcare provider, or as advised, if the pain doesnt begin to improve within the next week.  If X-rays were taken, you will be notified of any new findings that may affect your care.  When to seek medical advice  Call your healthcare provider right away if any of these occur:  · Pain not improving with the above treatment  · Fingers or hand become cold, blue, numb, or tingly  · Your whole arm becomes swollen or weak  Date Last Reviewed: 11/23/2015  © 8137-7844 [employer]. [address] [place] [address], [place] [address]. All rights reserved. This information is not intended as a substitute for professional medical care. Always follow your healthcare professional's instructions.

## 2021-01-28 ENCOUNTER — OFFICE VISIT (OUTPATIENT)
Dept: ORTHOPEDICS | Facility: CLINIC | Age: 66
End: 2021-01-28
Payer: MEDICARE

## 2021-01-28 VITALS
RESPIRATION RATE: 18 BRPM | SYSTOLIC BLOOD PRESSURE: 124 MMHG | TEMPERATURE: 98 F | HEART RATE: 72 BPM | DIASTOLIC BLOOD PRESSURE: 80 MMHG | HEIGHT: 60 IN | WEIGHT: 130.06 LBS | BODY MASS INDEX: 25.53 KG/M2

## 2021-01-28 DIAGNOSIS — G56.03 BILATERAL CARPAL TUNNEL SYNDROME: Primary | ICD-10-CM

## 2021-01-28 DIAGNOSIS — G56.01 RIGHT CARPAL TUNNEL SYNDROME: Primary | ICD-10-CM

## 2021-01-28 PROCEDURE — 1126F AMNT PAIN NOTED NONE PRSNT: CPT | Mod: S$GLB,,, | Performed by: ORTHOPAEDIC SURGERY

## 2021-01-28 PROCEDURE — 99999 PR PBB SHADOW E&M-EST. PATIENT-LVL IV: ICD-10-PCS | Mod: PBBFAC,,, | Performed by: ORTHOPAEDIC SURGERY

## 2021-01-28 PROCEDURE — 99999 PR PBB SHADOW E&M-EST. PATIENT-LVL IV: CPT | Mod: PBBFAC,,, | Performed by: ORTHOPAEDIC SURGERY

## 2021-01-28 PROCEDURE — 99213 PR OFFICE/OUTPT VISIT, EST, LEVL III, 20-29 MIN: ICD-10-PCS | Mod: S$GLB,,, | Performed by: ORTHOPAEDIC SURGERY

## 2021-01-28 PROCEDURE — 3288F PR FALLS RISK ASSESSMENT DOCUMENTED: ICD-10-PCS | Mod: CPTII,S$GLB,, | Performed by: ORTHOPAEDIC SURGERY

## 2021-01-28 PROCEDURE — 3008F PR BODY MASS INDEX (BMI) DOCUMENTED: ICD-10-PCS | Mod: CPTII,S$GLB,, | Performed by: ORTHOPAEDIC SURGERY

## 2021-01-28 PROCEDURE — 1101F PT FALLS ASSESS-DOCD LE1/YR: CPT | Mod: CPTII,S$GLB,, | Performed by: ORTHOPAEDIC SURGERY

## 2021-01-28 PROCEDURE — 1101F PR PT FALLS ASSESS DOC 0-1 FALLS W/OUT INJ PAST YR: ICD-10-PCS | Mod: CPTII,S$GLB,, | Performed by: ORTHOPAEDIC SURGERY

## 2021-01-28 PROCEDURE — 99213 OFFICE O/P EST LOW 20 MIN: CPT | Mod: S$GLB,,, | Performed by: ORTHOPAEDIC SURGERY

## 2021-01-28 PROCEDURE — 1126F PR PAIN SEVERITY QUANTIFIED, NO PAIN PRESENT: ICD-10-PCS | Mod: S$GLB,,, | Performed by: ORTHOPAEDIC SURGERY

## 2021-01-28 PROCEDURE — 3288F FALL RISK ASSESSMENT DOCD: CPT | Mod: CPTII,S$GLB,, | Performed by: ORTHOPAEDIC SURGERY

## 2021-01-28 PROCEDURE — 3008F BODY MASS INDEX DOCD: CPT | Mod: CPTII,S$GLB,, | Performed by: ORTHOPAEDIC SURGERY

## 2021-01-28 RX ORDER — CYCLOBENZAPRINE HCL 10 MG
TABLET ORAL 3 TIMES DAILY PRN
COMMUNITY
End: 2022-03-31

## 2021-02-24 ENCOUNTER — HOSPITAL ENCOUNTER (OUTPATIENT)
Dept: PREADMISSION TESTING | Facility: HOSPITAL | Age: 66
Discharge: HOME | End: 2021-02-24
Attending: ORTHOPAEDIC SURGERY
Payer: MEDICARE

## 2021-02-24 ENCOUNTER — OFFICE VISIT (OUTPATIENT)
Dept: ORTHOPEDICS | Facility: CLINIC | Age: 66
End: 2021-02-24
Payer: MEDICARE

## 2021-02-24 VITALS
RESPIRATION RATE: 18 BRPM | BODY MASS INDEX: 25.87 KG/M2 | WEIGHT: 131.75 LBS | DIASTOLIC BLOOD PRESSURE: 78 MMHG | SYSTOLIC BLOOD PRESSURE: 132 MMHG | TEMPERATURE: 98 F | HEIGHT: 60 IN

## 2021-02-24 DIAGNOSIS — G56.01 RIGHT CARPAL TUNNEL SYNDROME: Primary | ICD-10-CM

## 2021-02-24 PROCEDURE — 1126F PR PAIN SEVERITY QUANTIFIED, NO PAIN PRESENT: ICD-10-PCS | Mod: S$GLB,,, | Performed by: PHYSICIAN ASSISTANT

## 2021-02-24 PROCEDURE — 1101F PR PT FALLS ASSESS DOC 0-1 FALLS W/OUT INJ PAST YR: ICD-10-PCS | Mod: CPTII,S$GLB,, | Performed by: PHYSICIAN ASSISTANT

## 2021-02-24 PROCEDURE — 3288F FALL RISK ASSESSMENT DOCD: CPT | Mod: CPTII,S$GLB,, | Performed by: PHYSICIAN ASSISTANT

## 2021-02-24 PROCEDURE — 3008F BODY MASS INDEX DOCD: CPT | Mod: CPTII,S$GLB,, | Performed by: PHYSICIAN ASSISTANT

## 2021-02-24 PROCEDURE — 1126F AMNT PAIN NOTED NONE PRSNT: CPT | Mod: S$GLB,,, | Performed by: PHYSICIAN ASSISTANT

## 2021-02-24 PROCEDURE — 3288F PR FALLS RISK ASSESSMENT DOCUMENTED: ICD-10-PCS | Mod: CPTII,S$GLB,, | Performed by: PHYSICIAN ASSISTANT

## 2021-02-24 PROCEDURE — 1101F PT FALLS ASSESS-DOCD LE1/YR: CPT | Mod: CPTII,S$GLB,, | Performed by: PHYSICIAN ASSISTANT

## 2021-02-24 PROCEDURE — 99213 OFFICE O/P EST LOW 20 MIN: CPT | Mod: S$GLB,,, | Performed by: PHYSICIAN ASSISTANT

## 2021-02-24 PROCEDURE — 99999 PR PBB SHADOW E&M-EST. PATIENT-LVL III: CPT | Mod: PBBFAC,,, | Performed by: PHYSICIAN ASSISTANT

## 2021-02-24 PROCEDURE — 99213 PR OFFICE/OUTPT VISIT, EST, LEVL III, 20-29 MIN: ICD-10-PCS | Mod: S$GLB,,, | Performed by: PHYSICIAN ASSISTANT

## 2021-02-24 PROCEDURE — 3008F PR BODY MASS INDEX (BMI) DOCUMENTED: ICD-10-PCS | Mod: CPTII,S$GLB,, | Performed by: PHYSICIAN ASSISTANT

## 2021-02-24 PROCEDURE — 99999 PR PBB SHADOW E&M-EST. PATIENT-LVL III: ICD-10-PCS | Mod: PBBFAC,,, | Performed by: PHYSICIAN ASSISTANT

## 2021-02-25 ENCOUNTER — ANESTHESIA EVENT (OUTPATIENT)
Dept: SURGERY | Facility: HOSPITAL | Age: 66
End: 2021-02-25
Payer: MEDICARE

## 2021-02-25 ENCOUNTER — HOSPITAL ENCOUNTER (OUTPATIENT)
Facility: HOSPITAL | Age: 66
Discharge: HOME | End: 2021-02-25
Attending: ORTHOPAEDIC SURGERY | Admitting: ORTHOPAEDIC SURGERY
Payer: MEDICARE

## 2021-02-25 ENCOUNTER — ANESTHESIA (OUTPATIENT)
Dept: SURGERY | Facility: HOSPITAL | Age: 66
End: 2021-02-25
Payer: MEDICARE

## 2021-02-25 VITALS
HEART RATE: 54 BPM | DIASTOLIC BLOOD PRESSURE: 91 MMHG | OXYGEN SATURATION: 94 % | SYSTOLIC BLOOD PRESSURE: 130 MMHG | RESPIRATION RATE: 18 BRPM

## 2021-02-25 DIAGNOSIS — G56.01 CARPAL TUNNEL SYNDROME, RIGHT: Primary | ICD-10-CM

## 2021-02-25 LAB — SARS-COV-2 RDRP RESP QL NAA+PROBE: NEGATIVE

## 2021-02-25 PROCEDURE — 36000706: Performed by: ORTHOPAEDIC SURGERY

## 2021-02-25 PROCEDURE — 64721 PR REVISE MEDIAN N/CARPAL TUNNEL SURG: ICD-10-PCS | Mod: RT,,, | Performed by: ORTHOPAEDIC SURGERY

## 2021-02-25 PROCEDURE — 01810 ANES PX NRV MUSC F/ARM WRST: CPT | Mod: QZ | Performed by: NURSE ANESTHETIST, CERTIFIED REGISTERED

## 2021-02-25 PROCEDURE — 37000009 HC ANESTHESIA EA ADD 15 MINS: Performed by: ORTHOPAEDIC SURGERY

## 2021-02-25 PROCEDURE — 25000003 PHARM REV CODE 250: Performed by: NURSE ANESTHETIST, CERTIFIED REGISTERED

## 2021-02-25 PROCEDURE — 36000707: Performed by: ORTHOPAEDIC SURGERY

## 2021-02-25 PROCEDURE — U0002 COVID-19 LAB TEST NON-CDC: HCPCS

## 2021-02-25 PROCEDURE — 64721 CARPAL TUNNEL SURGERY: CPT | Mod: RT,,, | Performed by: ORTHOPAEDIC SURGERY

## 2021-02-25 PROCEDURE — 37000008 HC ANESTHESIA 1ST 15 MINUTES: Performed by: ORTHOPAEDIC SURGERY

## 2021-02-25 PROCEDURE — 25000003 PHARM REV CODE 250: Performed by: ORTHOPAEDIC SURGERY

## 2021-02-25 PROCEDURE — 71000033 HC RECOVERY, INTIAL HOUR: Performed by: ORTHOPAEDIC SURGERY

## 2021-02-25 PROCEDURE — 63600175 PHARM REV CODE 636 W HCPCS: Performed by: NURSE ANESTHETIST, CERTIFIED REGISTERED

## 2021-02-25 RX ORDER — BUPIVACAINE HYDROCHLORIDE 2.5 MG/ML
INJECTION, SOLUTION EPIDURAL; INFILTRATION; INTRACAUDAL
Status: DISCONTINUED
Start: 2021-02-25 | End: 2021-02-25 | Stop reason: HOSPADM

## 2021-02-25 RX ORDER — TRAMADOL HYDROCHLORIDE 50 MG/1
50 TABLET ORAL EVERY 6 HOURS PRN
Qty: 15 TABLET | Refills: 0 | Status: SHIPPED | OUTPATIENT
Start: 2021-02-25 | End: 2022-03-25

## 2021-02-25 RX ORDER — BUPIVACAINE HYDROCHLORIDE 2.5 MG/ML
INJECTION, SOLUTION EPIDURAL; INFILTRATION; INTRACAUDAL
Status: DISCONTINUED | OUTPATIENT
Start: 2021-02-25 | End: 2021-02-25 | Stop reason: HOSPADM

## 2021-02-25 RX ORDER — DEXAMETHASONE SODIUM PHOSPHATE 4 MG/ML
INJECTION, SOLUTION INTRA-ARTICULAR; INTRALESIONAL; INTRAMUSCULAR; INTRAVENOUS; SOFT TISSUE
Status: DISCONTINUED | OUTPATIENT
Start: 2021-02-25 | End: 2021-02-25

## 2021-02-25 RX ORDER — CEFAZOLIN SODIUM 1 G/3ML
INJECTION, POWDER, FOR SOLUTION INTRAMUSCULAR; INTRAVENOUS
Status: DISCONTINUED | OUTPATIENT
Start: 2021-02-25 | End: 2021-02-25

## 2021-02-25 RX ORDER — SODIUM CHLORIDE, SODIUM LACTATE, POTASSIUM CHLORIDE, CALCIUM CHLORIDE 600; 310; 30; 20 MG/100ML; MG/100ML; MG/100ML; MG/100ML
INJECTION, SOLUTION INTRAVENOUS CONTINUOUS PRN
Status: DISCONTINUED | OUTPATIENT
Start: 2021-02-25 | End: 2021-02-25

## 2021-02-25 RX ORDER — PROPOFOL 10 MG/ML
VIAL (ML) INTRAVENOUS
Status: DISCONTINUED | OUTPATIENT
Start: 2021-02-25 | End: 2021-02-25

## 2021-02-25 RX ORDER — FENTANYL CITRATE 50 UG/ML
INJECTION, SOLUTION INTRAMUSCULAR; INTRAVENOUS
Status: DISCONTINUED | OUTPATIENT
Start: 2021-02-25 | End: 2021-02-25

## 2021-02-25 RX ORDER — MIDAZOLAM HYDROCHLORIDE 1 MG/ML
INJECTION INTRAMUSCULAR; INTRAVENOUS
Status: DISCONTINUED | OUTPATIENT
Start: 2021-02-25 | End: 2021-02-25

## 2021-02-25 RX ORDER — LIDOCAINE HCL/PF 100 MG/5ML
SYRINGE (ML) INTRAVENOUS
Status: DISCONTINUED | OUTPATIENT
Start: 2021-02-25 | End: 2021-02-25

## 2021-02-25 RX ORDER — ONDANSETRON 2 MG/ML
INJECTION INTRAMUSCULAR; INTRAVENOUS
Status: DISCONTINUED | OUTPATIENT
Start: 2021-02-25 | End: 2021-02-25

## 2021-02-25 RX ADMIN — FENTANYL CITRATE 50 MCG: 50 INJECTION, SOLUTION INTRAMUSCULAR; INTRAVENOUS at 11:02

## 2021-02-25 RX ADMIN — ONDANSETRON 8 MG: 2 INJECTION, SOLUTION INTRAMUSCULAR; INTRAVENOUS at 12:02

## 2021-02-25 RX ADMIN — SODIUM CHLORIDE, SODIUM LACTATE, POTASSIUM CHLORIDE, AND CALCIUM CHLORIDE: .6; .31; .03; .02 INJECTION, SOLUTION INTRAVENOUS at 11:02

## 2021-02-25 RX ADMIN — PROPOFOL 140 MG: 10 INJECTION, EMULSION INTRAVENOUS at 11:02

## 2021-02-25 RX ADMIN — CEFAZOLIN 2 G: 1 INJECTION, POWDER, FOR SOLUTION INTRAVENOUS at 12:02

## 2021-02-25 RX ADMIN — FENTANYL CITRATE 50 MCG: 50 INJECTION, SOLUTION INTRAMUSCULAR; INTRAVENOUS at 12:02

## 2021-02-25 RX ADMIN — LIDOCAINE HYDROCHLORIDE 50 MG: 20 INJECTION, SOLUTION INTRAVENOUS at 11:02

## 2021-02-25 RX ADMIN — MIDAZOLAM HYDROCHLORIDE 2 MG: 1 INJECTION, SOLUTION INTRAMUSCULAR; INTRAVENOUS at 11:02

## 2021-02-25 RX ADMIN — DEXAMETHASONE SODIUM PHOSPHATE 8 MG: 4 INJECTION, SOLUTION INTRAMUSCULAR; INTRAVENOUS at 12:02

## 2021-03-09 ENCOUNTER — OFFICE VISIT (OUTPATIENT)
Dept: ORTHOPEDICS | Facility: CLINIC | Age: 66
End: 2021-03-09
Payer: MEDICARE

## 2021-03-09 VITALS
DIASTOLIC BLOOD PRESSURE: 68 MMHG | SYSTOLIC BLOOD PRESSURE: 128 MMHG | WEIGHT: 128.63 LBS | TEMPERATURE: 98 F | HEIGHT: 60 IN | RESPIRATION RATE: 16 BRPM | BODY MASS INDEX: 25.26 KG/M2 | HEART RATE: 62 BPM

## 2021-03-09 DIAGNOSIS — Z98.890 S/P CARPAL TUNNEL RELEASE: Primary | ICD-10-CM

## 2021-03-09 PROCEDURE — 1101F PT FALLS ASSESS-DOCD LE1/YR: CPT | Mod: CPTII,S$GLB,, | Performed by: PHYSICIAN ASSISTANT

## 2021-03-09 PROCEDURE — 3288F FALL RISK ASSESSMENT DOCD: CPT | Mod: CPTII,S$GLB,, | Performed by: PHYSICIAN ASSISTANT

## 2021-03-09 PROCEDURE — 3008F BODY MASS INDEX DOCD: CPT | Mod: CPTII,S$GLB,, | Performed by: PHYSICIAN ASSISTANT

## 2021-03-09 PROCEDURE — 99024 POSTOP FOLLOW-UP VISIT: CPT | Mod: S$GLB,,, | Performed by: PHYSICIAN ASSISTANT

## 2021-03-09 PROCEDURE — 99999 PR PBB SHADOW E&M-EST. PATIENT-LVL IV: ICD-10-PCS | Mod: PBBFAC,,, | Performed by: PHYSICIAN ASSISTANT

## 2021-03-09 PROCEDURE — 3008F PR BODY MASS INDEX (BMI) DOCUMENTED: ICD-10-PCS | Mod: CPTII,S$GLB,, | Performed by: PHYSICIAN ASSISTANT

## 2021-03-09 PROCEDURE — 99999 PR PBB SHADOW E&M-EST. PATIENT-LVL IV: CPT | Mod: PBBFAC,,, | Performed by: PHYSICIAN ASSISTANT

## 2021-03-09 PROCEDURE — 1126F PR PAIN SEVERITY QUANTIFIED, NO PAIN PRESENT: ICD-10-PCS | Mod: S$GLB,,, | Performed by: PHYSICIAN ASSISTANT

## 2021-03-09 PROCEDURE — 3288F PR FALLS RISK ASSESSMENT DOCUMENTED: ICD-10-PCS | Mod: CPTII,S$GLB,, | Performed by: PHYSICIAN ASSISTANT

## 2021-03-09 PROCEDURE — 1126F AMNT PAIN NOTED NONE PRSNT: CPT | Mod: S$GLB,,, | Performed by: PHYSICIAN ASSISTANT

## 2021-03-09 PROCEDURE — 99024 PR POST-OP FOLLOW-UP VISIT: ICD-10-PCS | Mod: S$GLB,,, | Performed by: PHYSICIAN ASSISTANT

## 2021-03-09 PROCEDURE — 1101F PR PT FALLS ASSESS DOC 0-1 FALLS W/OUT INJ PAST YR: ICD-10-PCS | Mod: CPTII,S$GLB,, | Performed by: PHYSICIAN ASSISTANT

## 2021-04-08 ENCOUNTER — OFFICE VISIT (OUTPATIENT)
Dept: ORTHOPEDICS | Facility: CLINIC | Age: 66
End: 2021-04-08
Payer: MEDICARE

## 2021-04-08 VITALS
DIASTOLIC BLOOD PRESSURE: 80 MMHG | WEIGHT: 128.75 LBS | HEIGHT: 60 IN | OXYGEN SATURATION: 98 % | SYSTOLIC BLOOD PRESSURE: 120 MMHG | HEART RATE: 62 BPM | RESPIRATION RATE: 14 BRPM | BODY MASS INDEX: 25.28 KG/M2

## 2021-04-08 DIAGNOSIS — Z98.890 S/P CARPAL TUNNEL RELEASE: Primary | ICD-10-CM

## 2021-04-08 PROCEDURE — 99999 PR PBB SHADOW E&M-EST. PATIENT-LVL IV: ICD-10-PCS | Mod: PBBFAC,,, | Performed by: ORTHOPAEDIC SURGERY

## 2021-04-08 PROCEDURE — 1101F PR PT FALLS ASSESS DOC 0-1 FALLS W/OUT INJ PAST YR: ICD-10-PCS | Mod: CPTII,S$GLB,, | Performed by: ORTHOPAEDIC SURGERY

## 2021-04-08 PROCEDURE — 99024 POSTOP FOLLOW-UP VISIT: CPT | Mod: S$GLB,,, | Performed by: ORTHOPAEDIC SURGERY

## 2021-04-08 PROCEDURE — 1126F AMNT PAIN NOTED NONE PRSNT: CPT | Mod: S$GLB,,, | Performed by: ORTHOPAEDIC SURGERY

## 2021-04-08 PROCEDURE — 1126F PR PAIN SEVERITY QUANTIFIED, NO PAIN PRESENT: ICD-10-PCS | Mod: S$GLB,,, | Performed by: ORTHOPAEDIC SURGERY

## 2021-04-08 PROCEDURE — 1101F PT FALLS ASSESS-DOCD LE1/YR: CPT | Mod: CPTII,S$GLB,, | Performed by: ORTHOPAEDIC SURGERY

## 2021-04-08 PROCEDURE — 3008F PR BODY MASS INDEX (BMI) DOCUMENTED: ICD-10-PCS | Mod: CPTII,S$GLB,, | Performed by: ORTHOPAEDIC SURGERY

## 2021-04-08 PROCEDURE — 3008F BODY MASS INDEX DOCD: CPT | Mod: CPTII,S$GLB,, | Performed by: ORTHOPAEDIC SURGERY

## 2021-04-08 PROCEDURE — 3288F PR FALLS RISK ASSESSMENT DOCUMENTED: ICD-10-PCS | Mod: CPTII,S$GLB,, | Performed by: ORTHOPAEDIC SURGERY

## 2021-04-08 PROCEDURE — 99999 PR PBB SHADOW E&M-EST. PATIENT-LVL IV: CPT | Mod: PBBFAC,,, | Performed by: ORTHOPAEDIC SURGERY

## 2021-04-08 PROCEDURE — 99024 PR POST-OP FOLLOW-UP VISIT: ICD-10-PCS | Mod: S$GLB,,, | Performed by: ORTHOPAEDIC SURGERY

## 2021-04-08 PROCEDURE — 3288F FALL RISK ASSESSMENT DOCD: CPT | Mod: CPTII,S$GLB,, | Performed by: ORTHOPAEDIC SURGERY

## 2021-07-01 ENCOUNTER — PATIENT MESSAGE (OUTPATIENT)
Dept: ADMINISTRATIVE | Facility: OTHER | Age: 66
End: 2021-07-01

## 2021-08-05 ENCOUNTER — TELEPHONE (OUTPATIENT)
Dept: ORTHOPEDICS | Facility: CLINIC | Age: 66
End: 2021-08-05

## 2021-08-05 ENCOUNTER — OFFICE VISIT (OUTPATIENT)
Dept: ORTHOPEDICS | Facility: CLINIC | Age: 66
End: 2021-08-05
Payer: MEDICARE

## 2021-08-05 VITALS
RESPIRATION RATE: 18 BRPM | DIASTOLIC BLOOD PRESSURE: 80 MMHG | SYSTOLIC BLOOD PRESSURE: 134 MMHG | HEIGHT: 60 IN | BODY MASS INDEX: 25.55 KG/M2 | WEIGHT: 130.13 LBS | OXYGEN SATURATION: 98 % | HEART RATE: 61 BPM

## 2021-08-05 DIAGNOSIS — M65.312 TRIGGER THUMB OF BOTH HANDS: ICD-10-CM

## 2021-08-05 DIAGNOSIS — M79.641 BILATERAL HAND PAIN: Primary | ICD-10-CM

## 2021-08-05 DIAGNOSIS — M65.311 TRIGGER THUMB OF BOTH HANDS: ICD-10-CM

## 2021-08-05 DIAGNOSIS — M25.642 STIFFNESS OF JOINTS OF BOTH HANDS: ICD-10-CM

## 2021-08-05 DIAGNOSIS — M79.642 BILATERAL HAND PAIN: Primary | ICD-10-CM

## 2021-08-05 DIAGNOSIS — M25.641 STIFFNESS OF JOINTS OF BOTH HANDS: ICD-10-CM

## 2021-08-05 PROCEDURE — 99213 PR OFFICE/OUTPT VISIT, EST, LEVL III, 20-29 MIN: ICD-10-PCS | Mod: S$GLB,,, | Performed by: PHYSICIAN ASSISTANT

## 2021-08-05 PROCEDURE — 1160F RVW MEDS BY RX/DR IN RCRD: CPT | Mod: CPTII,S$GLB,, | Performed by: PHYSICIAN ASSISTANT

## 2021-08-05 PROCEDURE — 3008F BODY MASS INDEX DOCD: CPT | Mod: CPTII,S$GLB,, | Performed by: PHYSICIAN ASSISTANT

## 2021-08-05 PROCEDURE — 1160F PR REVIEW ALL MEDS BY PRESCRIBER/CLIN PHARMACIST DOCUMENTED: ICD-10-PCS | Mod: CPTII,S$GLB,, | Performed by: PHYSICIAN ASSISTANT

## 2021-08-05 PROCEDURE — 99999 PR PBB SHADOW E&M-EST. PATIENT-LVL IV: CPT | Mod: PBBFAC,,, | Performed by: PHYSICIAN ASSISTANT

## 2021-08-05 PROCEDURE — 1126F AMNT PAIN NOTED NONE PRSNT: CPT | Mod: CPTII,S$GLB,, | Performed by: PHYSICIAN ASSISTANT

## 2021-08-05 PROCEDURE — 1126F PR PAIN SEVERITY QUANTIFIED, NO PAIN PRESENT: ICD-10-PCS | Mod: CPTII,S$GLB,, | Performed by: PHYSICIAN ASSISTANT

## 2021-08-05 PROCEDURE — 3075F SYST BP GE 130 - 139MM HG: CPT | Mod: CPTII,S$GLB,, | Performed by: PHYSICIAN ASSISTANT

## 2021-08-05 PROCEDURE — 1101F PR PT FALLS ASSESS DOC 0-1 FALLS W/OUT INJ PAST YR: ICD-10-PCS | Mod: CPTII,S$GLB,, | Performed by: PHYSICIAN ASSISTANT

## 2021-08-05 PROCEDURE — 1159F PR MEDICATION LIST DOCUMENTED IN MEDICAL RECORD: ICD-10-PCS | Mod: CPTII,S$GLB,, | Performed by: PHYSICIAN ASSISTANT

## 2021-08-05 PROCEDURE — 1159F MED LIST DOCD IN RCRD: CPT | Mod: CPTII,S$GLB,, | Performed by: PHYSICIAN ASSISTANT

## 2021-08-05 PROCEDURE — 3288F PR FALLS RISK ASSESSMENT DOCUMENTED: ICD-10-PCS | Mod: CPTII,S$GLB,, | Performed by: PHYSICIAN ASSISTANT

## 2021-08-05 PROCEDURE — 1101F PT FALLS ASSESS-DOCD LE1/YR: CPT | Mod: CPTII,S$GLB,, | Performed by: PHYSICIAN ASSISTANT

## 2021-08-05 PROCEDURE — 99213 OFFICE O/P EST LOW 20 MIN: CPT | Mod: S$GLB,,, | Performed by: PHYSICIAN ASSISTANT

## 2021-08-05 PROCEDURE — 99999 PR PBB SHADOW E&M-EST. PATIENT-LVL IV: ICD-10-PCS | Mod: PBBFAC,,, | Performed by: PHYSICIAN ASSISTANT

## 2021-08-05 PROCEDURE — 3075F PR MOST RECENT SYSTOLIC BLOOD PRESS GE 130-139MM HG: ICD-10-PCS | Mod: CPTII,S$GLB,, | Performed by: PHYSICIAN ASSISTANT

## 2021-08-05 PROCEDURE — 3079F PR MOST RECENT DIASTOLIC BLOOD PRESSURE 80-89 MM HG: ICD-10-PCS | Mod: CPTII,S$GLB,, | Performed by: PHYSICIAN ASSISTANT

## 2021-08-05 PROCEDURE — 3288F FALL RISK ASSESSMENT DOCD: CPT | Mod: CPTII,S$GLB,, | Performed by: PHYSICIAN ASSISTANT

## 2021-08-05 PROCEDURE — 3079F DIAST BP 80-89 MM HG: CPT | Mod: CPTII,S$GLB,, | Performed by: PHYSICIAN ASSISTANT

## 2021-08-05 PROCEDURE — 3008F PR BODY MASS INDEX (BMI) DOCUMENTED: ICD-10-PCS | Mod: CPTII,S$GLB,, | Performed by: PHYSICIAN ASSISTANT

## 2021-08-05 RX ORDER — DICLOFENAC SODIUM 10 MG/G
2 GEL TOPICAL 4 TIMES DAILY
Qty: 1 TUBE | Refills: 1 | Status: SHIPPED | OUTPATIENT
Start: 2021-08-05

## 2021-08-05 RX ORDER — DOXEPIN HYDROCHLORIDE 10 MG/1
CAPSULE ORAL
COMMUNITY
End: 2022-03-25

## 2021-09-20 ENCOUNTER — HOSPITAL ENCOUNTER (EMERGENCY)
Facility: HOSPITAL | Age: 66
Discharge: HOME | End: 2021-09-20
Attending: STUDENT IN AN ORGANIZED HEALTH CARE EDUCATION/TRAINING PROGRAM
Payer: MEDICARE

## 2021-09-20 VITALS
WEIGHT: 125.44 LBS | OXYGEN SATURATION: 97 % | HEART RATE: 66 BPM | RESPIRATION RATE: 20 BRPM | BODY MASS INDEX: 24.5 KG/M2 | DIASTOLIC BLOOD PRESSURE: 84 MMHG | TEMPERATURE: 97 F | SYSTOLIC BLOOD PRESSURE: 174 MMHG

## 2021-09-20 DIAGNOSIS — R42 DIZZINESS: ICD-10-CM

## 2021-09-20 DIAGNOSIS — R20.2 PARESTHESIAS: Primary | ICD-10-CM

## 2021-09-20 LAB
ALBUMIN SERPL BCP-MCNC: 4.6 G/DL (ref 3.5–5.2)
ALP SERPL-CCNC: 106 U/L (ref 55–135)
ALT SERPL W/O P-5'-P-CCNC: 21 U/L (ref 10–44)
ANION GAP SERPL CALC-SCNC: 11 MMOL/L (ref 8–16)
AST SERPL-CCNC: 20 U/L (ref 10–40)
BASOPHILS # BLD AUTO: 0.03 K/UL (ref 0–0.2)
BASOPHILS NFR BLD: 0.4 % (ref 0–1.9)
BILIRUB SERPL-MCNC: 0.9 MG/DL (ref 0.1–1)
BUN SERPL-MCNC: 7 MG/DL (ref 8–23)
CALCIUM SERPL-MCNC: 9.8 MG/DL (ref 8.7–10.5)
CHLORIDE SERPL-SCNC: 105 MMOL/L (ref 95–110)
CO2 SERPL-SCNC: 26 MMOL/L (ref 23–29)
CREAT SERPL-MCNC: 0.7 MG/DL (ref 0.5–1.4)
DIFFERENTIAL METHOD: ABNORMAL
EOSINOPHIL # BLD AUTO: 0.1 K/UL (ref 0–0.5)
EOSINOPHIL NFR BLD: 0.7 % (ref 0–8)
ERYTHROCYTE [DISTWIDTH] IN BLOOD BY AUTOMATED COUNT: 13.3 % (ref 11.5–14.5)
EST. GFR  (AFRICAN AMERICAN): >60 ML/MIN/1.73 M^2
EST. GFR  (NON AFRICAN AMERICAN): >60 ML/MIN/1.73 M^2
GLUCOSE SERPL-MCNC: 107 MG/DL (ref 70–110)
HCT VFR BLD AUTO: 47.6 % (ref 37–48.5)
HGB BLD-MCNC: 16 G/DL (ref 12–16)
IMM GRANULOCYTES # BLD AUTO: 0.03 K/UL (ref 0–0.04)
IMM GRANULOCYTES NFR BLD AUTO: 0.4 % (ref 0–0.5)
LYMPHOCYTES # BLD AUTO: 1.8 K/UL (ref 1–4.8)
LYMPHOCYTES NFR BLD: 26.1 % (ref 18–48)
MCH RBC QN AUTO: 28.8 PG (ref 27–31)
MCHC RBC AUTO-ENTMCNC: 33.6 G/DL (ref 32–36)
MCV RBC AUTO: 86 FL (ref 82–98)
MONOCYTES # BLD AUTO: 0.4 K/UL (ref 0.3–1)
MONOCYTES NFR BLD: 5.8 % (ref 4–15)
NEUTROPHILS # BLD AUTO: 4.5 K/UL (ref 1.8–7.7)
NEUTROPHILS NFR BLD: 66.6 % (ref 38–73)
NRBC BLD-RTO: 0 /100 WBC
PLATELET # BLD AUTO: 301 K/UL (ref 150–450)
PMV BLD AUTO: 9.5 FL (ref 9.2–12.9)
POTASSIUM SERPL-SCNC: 3.5 MMOL/L (ref 3.5–5.1)
PROT SERPL-MCNC: 8.1 G/DL (ref 6–8.4)
RBC # BLD AUTO: 5.55 M/UL (ref 4–5.4)
SODIUM SERPL-SCNC: 142 MMOL/L (ref 136–145)
TROPONIN I SERPL DL<=0.01 NG/ML-MCNC: <0.006 NG/ML (ref 0–0.03)
WBC # BLD AUTO: 6.71 K/UL (ref 3.9–12.7)

## 2021-09-20 PROCEDURE — 80053 COMPREHEN METABOLIC PANEL: CPT | Performed by: STUDENT IN AN ORGANIZED HEALTH CARE EDUCATION/TRAINING PROGRAM

## 2021-09-20 PROCEDURE — 96372 THER/PROPH/DIAG INJ SC/IM: CPT | Mod: 59

## 2021-09-20 PROCEDURE — 85025 COMPLETE CBC W/AUTO DIFF WBC: CPT | Performed by: STUDENT IN AN ORGANIZED HEALTH CARE EDUCATION/TRAINING PROGRAM

## 2021-09-20 PROCEDURE — 84484 ASSAY OF TROPONIN QUANT: CPT | Performed by: STUDENT IN AN ORGANIZED HEALTH CARE EDUCATION/TRAINING PROGRAM

## 2021-09-20 PROCEDURE — 36415 COLL VENOUS BLD VENIPUNCTURE: CPT | Performed by: STUDENT IN AN ORGANIZED HEALTH CARE EDUCATION/TRAINING PROGRAM

## 2021-09-20 PROCEDURE — 99284 EMERGENCY DEPT VISIT MOD MDM: CPT | Mod: 25

## 2021-09-20 PROCEDURE — 93010 ELECTROCARDIOGRAM REPORT: CPT | Mod: ,,, | Performed by: INTERNAL MEDICINE

## 2021-09-20 PROCEDURE — 93005 ELECTROCARDIOGRAM TRACING: CPT

## 2021-09-20 PROCEDURE — 63600175 PHARM REV CODE 636 W HCPCS: Performed by: STUDENT IN AN ORGANIZED HEALTH CARE EDUCATION/TRAINING PROGRAM

## 2021-09-20 PROCEDURE — 93010 EKG 12-LEAD: ICD-10-PCS | Mod: ,,, | Performed by: INTERNAL MEDICINE

## 2021-09-20 PROCEDURE — 25500020 PHARM REV CODE 255: Performed by: STUDENT IN AN ORGANIZED HEALTH CARE EDUCATION/TRAINING PROGRAM

## 2021-09-20 RX ORDER — KETOROLAC TROMETHAMINE 30 MG/ML
30 INJECTION, SOLUTION INTRAMUSCULAR; INTRAVENOUS
Status: COMPLETED | OUTPATIENT
Start: 2021-09-20 | End: 2021-09-20

## 2021-09-20 RX ADMIN — IOHEXOL 75 ML: 350 INJECTION, SOLUTION INTRAVENOUS at 02:09

## 2021-09-20 RX ADMIN — KETOROLAC TROMETHAMINE 30 MG: 30 INJECTION, SOLUTION INTRAMUSCULAR; INTRAVENOUS at 05:09

## 2022-03-17 ENCOUNTER — TELEPHONE (OUTPATIENT)
Dept: OBSTETRICS AND GYNECOLOGY | Facility: CLINIC | Age: 67
End: 2022-03-17
Payer: MEDICARE

## 2022-03-17 NOTE — TELEPHONE ENCOUNTER
Called pt and she desires appt d/t complaints of possible prolapse since August 2021. Pt desires appt with Dr. Mendoza. Appt given for 3/25/22 @ 2:45. Address given. Pt voiced understanding.

## 2022-03-25 ENCOUNTER — OFFICE VISIT (OUTPATIENT)
Dept: OBSTETRICS AND GYNECOLOGY | Facility: CLINIC | Age: 67
End: 2022-03-25
Payer: MEDICARE

## 2022-03-25 VITALS
DIASTOLIC BLOOD PRESSURE: 80 MMHG | WEIGHT: 131.31 LBS | HEIGHT: 60 IN | BODY MASS INDEX: 25.78 KG/M2 | RESPIRATION RATE: 15 BRPM | SYSTOLIC BLOOD PRESSURE: 120 MMHG | HEART RATE: 63 BPM

## 2022-03-25 DIAGNOSIS — N81.4 PELVIC RELAXATION DUE TO UTERINE PROLAPSE: ICD-10-CM

## 2022-03-25 DIAGNOSIS — Z79.890 POSTMENOPAUSAL HRT (HORMONE REPLACEMENT THERAPY): Primary | ICD-10-CM

## 2022-03-25 DIAGNOSIS — Z46.89 ENCOUNTER FOR FITTING AND ADJUSTMENT OF PESSARY: ICD-10-CM

## 2022-03-25 PROCEDURE — 1101F PR PT FALLS ASSESS DOC 0-1 FALLS W/OUT INJ PAST YR: ICD-10-PCS | Mod: CPTII,S$GLB,, | Performed by: OBSTETRICS & GYNECOLOGY

## 2022-03-25 PROCEDURE — 1101F PT FALLS ASSESS-DOCD LE1/YR: CPT | Mod: CPTII,S$GLB,, | Performed by: OBSTETRICS & GYNECOLOGY

## 2022-03-25 PROCEDURE — 3074F PR MOST RECENT SYSTOLIC BLOOD PRESSURE < 130 MM HG: ICD-10-PCS | Mod: CPTII,S$GLB,, | Performed by: OBSTETRICS & GYNECOLOGY

## 2022-03-25 PROCEDURE — 57160 PR FIT/INSERT INTRAVAG SUPPORT DEVICE: ICD-10-PCS | Mod: S$GLB,,, | Performed by: OBSTETRICS & GYNECOLOGY

## 2022-03-25 PROCEDURE — 3079F DIAST BP 80-89 MM HG: CPT | Mod: CPTII,S$GLB,, | Performed by: OBSTETRICS & GYNECOLOGY

## 2022-03-25 PROCEDURE — 3079F PR MOST RECENT DIASTOLIC BLOOD PRESSURE 80-89 MM HG: ICD-10-PCS | Mod: CPTII,S$GLB,, | Performed by: OBSTETRICS & GYNECOLOGY

## 2022-03-25 PROCEDURE — 57160 INSERT PESSARY/OTHER DEVICE: CPT | Mod: S$GLB,,, | Performed by: OBSTETRICS & GYNECOLOGY

## 2022-03-25 PROCEDURE — 1126F PR PAIN SEVERITY QUANTIFIED, NO PAIN PRESENT: ICD-10-PCS | Mod: CPTII,S$GLB,, | Performed by: OBSTETRICS & GYNECOLOGY

## 2022-03-25 PROCEDURE — 1160F PR REVIEW ALL MEDS BY PRESCRIBER/CLIN PHARMACIST DOCUMENTED: ICD-10-PCS | Mod: CPTII,S$GLB,, | Performed by: OBSTETRICS & GYNECOLOGY

## 2022-03-25 PROCEDURE — 3288F PR FALLS RISK ASSESSMENT DOCUMENTED: ICD-10-PCS | Mod: CPTII,S$GLB,, | Performed by: OBSTETRICS & GYNECOLOGY

## 2022-03-25 PROCEDURE — 99999 PR PBB SHADOW E&M-EST. PATIENT-LVL III: ICD-10-PCS | Mod: PBBFAC,,, | Performed by: OBSTETRICS & GYNECOLOGY

## 2022-03-25 PROCEDURE — 3074F SYST BP LT 130 MM HG: CPT | Mod: CPTII,S$GLB,, | Performed by: OBSTETRICS & GYNECOLOGY

## 2022-03-25 PROCEDURE — 99999 PR PBB SHADOW E&M-EST. PATIENT-LVL III: CPT | Mod: PBBFAC,,, | Performed by: OBSTETRICS & GYNECOLOGY

## 2022-03-25 PROCEDURE — 3288F FALL RISK ASSESSMENT DOCD: CPT | Mod: CPTII,S$GLB,, | Performed by: OBSTETRICS & GYNECOLOGY

## 2022-03-25 PROCEDURE — 1160F RVW MEDS BY RX/DR IN RCRD: CPT | Mod: CPTII,S$GLB,, | Performed by: OBSTETRICS & GYNECOLOGY

## 2022-03-25 PROCEDURE — 99204 OFFICE O/P NEW MOD 45 MIN: CPT | Mod: 25,S$GLB,, | Performed by: OBSTETRICS & GYNECOLOGY

## 2022-03-25 PROCEDURE — 1126F AMNT PAIN NOTED NONE PRSNT: CPT | Mod: CPTII,S$GLB,, | Performed by: OBSTETRICS & GYNECOLOGY

## 2022-03-25 PROCEDURE — 99204 PR OFFICE/OUTPT VISIT, NEW, LEVL IV, 45-59 MIN: ICD-10-PCS | Mod: 25,S$GLB,, | Performed by: OBSTETRICS & GYNECOLOGY

## 2022-03-25 PROCEDURE — 3008F BODY MASS INDEX DOCD: CPT | Mod: CPTII,S$GLB,, | Performed by: OBSTETRICS & GYNECOLOGY

## 2022-03-25 PROCEDURE — 1159F MED LIST DOCD IN RCRD: CPT | Mod: CPTII,S$GLB,, | Performed by: OBSTETRICS & GYNECOLOGY

## 2022-03-25 PROCEDURE — 3008F PR BODY MASS INDEX (BMI) DOCUMENTED: ICD-10-PCS | Mod: CPTII,S$GLB,, | Performed by: OBSTETRICS & GYNECOLOGY

## 2022-03-25 PROCEDURE — 1159F PR MEDICATION LIST DOCUMENTED IN MEDICAL RECORD: ICD-10-PCS | Mod: CPTII,S$GLB,, | Performed by: OBSTETRICS & GYNECOLOGY

## 2022-03-25 NOTE — PROGRESS NOTES
"Subjective:       Patient ID: Tammie Sanders is a 66 y.o. female.    Chief Complaint:  Vaginal Prolapse      History of Present Illness  HPI  Prolapse  Patient complains of a a "buldge" that occasionally can be felt in the vagina. 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 Symptoms include: prolapse of tissue with straining. She is sexually active.    Hormone Replacement Counseling  Patient presents to discuss hormone replacement therapy. Patient is requesting hormone replacement therapy due to hot flashes, insomnia and hot flashes, mild vaginal dryness. The patient is not taking hormone replacement therapy. Patient denies post-menopausal vaginal bleeding. The patient is sexually active.  She was previously on an HRT combination patch, but insurance stopped covering.          GYN & OB History  No LMP recorded. Patient is postmenopausal.   Date of Last Pap: No result found    OB History    Para Term  AB Living   3 3           SAB IAB Ectopic Multiple Live Births                  # Outcome Date GA Lbr Sherman/2nd Weight Sex Delivery Anes PTL Lv   3 Para            2 Para            1 Para                Review of Systems  Review of Systems   Constitutional: Negative for chills, diaphoresis, fatigue, fever and unexpected weight change.   HENT: Negative for congestion, hearing loss, rhinorrhea and sore throat.    Eyes: Negative for pain, discharge and visual disturbance.   Respiratory: Negative for apnea, cough, shortness of breath and wheezing.    Cardiovascular: Negative for chest pain, palpitations and leg swelling.   Gastrointestinal: Negative for abdominal pain, constipation, diarrhea, nausea and vomiting.   Endocrine: Negative for cold intolerance and heat intolerance.   Genitourinary: Negative for difficulty urinating, dyspareunia, dysuria, flank pain, frequency, genital sores, hematuria, menstrual problem, pelvic pain, vaginal bleeding, vaginal discharge and " vaginal pain.   Musculoskeletal: Negative for arthralgias, back pain and joint swelling.   Skin: Negative for rash.   Neurological: Negative for dizziness, weakness, light-headedness, numbness and headaches.   Psychiatric/Behavioral: Negative for agitation and confusion. The patient is not nervous/anxious.            Objective:    Physical Exam:   Constitutional: She is oriented to person, place, and time. She appears well-developed and well-nourished. No distress.    HENT:   Head: Normocephalic and atraumatic.    Eyes: Conjunctivae and EOM are normal.      Pulmonary/Chest: Effort normal. No respiratory distress.          Genitourinary: The external female genitalia was normal.   Cervix is normal. Right adnexum displays no tenderness and no fullness. Left adnexum displays no tenderness and no fullness. There is unspecified prolapse of vaginal walls in the vagina. Uterus is uterine prolapse (grade 2). Urethral Meatus exhibits: prolapsedUrethra findings: no tendernessBladder findings: no bladder tenderness          Musculoskeletal: Normal range of motion.       Neurological: She is alert and oriented to person, place, and time.    Skin: Skin is warm and dry.    Psychiatric: She has a normal mood and affect. Her behavior is normal. Judgment and thought content normal.             PRE-PESSARY COUNSELING:  The patient was informed of the risks of pain or discomfort, continuous incontinence, urinary retention with insertion of a pessary and/or possible bleeding from granulation tissue after wearing the pessary for sometime. She was counseled on the alternatives to pessary insertion, including surgery or no treatment with continued symptoms, and she agrees to proceed.    PROCEDURE:  TIME OUT PERFORMED.  The patients vaginal length was estimated by bimanual exam. The prolapse was reduced manually without difficulty.  She was fitted with a # 4 Ring Pessary without difficulty.    The patient was able to sit, stand, walk and  either cough or urinate on the toilet after the procedure normally without expelling the pessary. The patient tolerated the procedure well.    ASSESSMENT:  Pelvic relaxation managed with pessary          Assessment:        1. Postmenopausal HRT (hormone replacement therapy)    2. Pelvic relaxation due to uterine prolapse    3. Encounter for fitting and adjustment of pessary               Plan:      Tammie was seen today for vaginal prolapse.    Diagnoses and all orders for this visit:    Postmenopausal HRT (hormone replacement therapy)  -     estradiol-norethindrone (COMBIPATCH) 0.05-0.14 mg/24 hr; Place 1 patch onto the skin twice a week.    Pelvic relaxation due to uterine prolapse    Encounter for fitting and adjustment of pessary    KEGEL exercise handout given.    POST PESSARY CHECK COUNSELING:  Report worsening urinary incontinency, urinary retention, pain, fever, foul smelling discharge or bleeding.  Importance of keeping follow-up appointments for a pessary check stressed.    Counseling lasted approximately 10 minutes and all her questions were answered.    FOLLOW-UP: In 4 weeks for pessary check.

## 2022-03-30 ENCOUNTER — TELEPHONE (OUTPATIENT)
Dept: OBSTETRICS AND GYNECOLOGY | Facility: CLINIC | Age: 67
End: 2022-03-30
Payer: MEDICARE

## 2022-03-30 NOTE — TELEPHONE ENCOUNTER
----- Message from Cha Tirado MA sent at 3/30/2022  2:51 PM CDT -----  Contact: self  Tammie Sanders  MRN: [id number]  Home Phone      [phone]  Work Phone      Not on file.  Mobile          [phone]    Patient Care Team:  Ignacia Chan MD as PCP - General (Family Medicine)  Dayan Duron MD as Obstetrician (Obstetrics)  OB? No  What phone number can you be reached at? [phone]  Message: Needs to speak to nurse regarding hormone medication.

## 2022-03-31 ENCOUNTER — OFFICE VISIT (OUTPATIENT)
Dept: ORTHOPEDICS | Facility: CLINIC | Age: 67
End: 2022-03-31
Payer: MEDICARE

## 2022-03-31 VITALS
HEIGHT: 60 IN | RESPIRATION RATE: 18 BRPM | BODY MASS INDEX: 25.62 KG/M2 | SYSTOLIC BLOOD PRESSURE: 136 MMHG | DIASTOLIC BLOOD PRESSURE: 82 MMHG | WEIGHT: 130.5 LBS | HEART RATE: 64 BPM

## 2022-03-31 DIAGNOSIS — G56.02 LEFT CARPAL TUNNEL SYNDROME: Primary | ICD-10-CM

## 2022-03-31 PROCEDURE — 99213 PR OFFICE/OUTPT VISIT, EST, LEVL III, 20-29 MIN: ICD-10-PCS | Mod: 25,S$GLB,, | Performed by: PHYSICIAN ASSISTANT

## 2022-03-31 PROCEDURE — 3008F PR BODY MASS INDEX (BMI) DOCUMENTED: ICD-10-PCS | Mod: CPTII,S$GLB,, | Performed by: PHYSICIAN ASSISTANT

## 2022-03-31 PROCEDURE — 3079F DIAST BP 80-89 MM HG: CPT | Mod: CPTII,S$GLB,, | Performed by: PHYSICIAN ASSISTANT

## 2022-03-31 PROCEDURE — 3075F PR MOST RECENT SYSTOLIC BLOOD PRESS GE 130-139MM HG: ICD-10-PCS | Mod: CPTII,S$GLB,, | Performed by: PHYSICIAN ASSISTANT

## 2022-03-31 PROCEDURE — 1126F AMNT PAIN NOTED NONE PRSNT: CPT | Mod: CPTII,S$GLB,, | Performed by: PHYSICIAN ASSISTANT

## 2022-03-31 PROCEDURE — 99999 PR PBB SHADOW E&M-EST. PATIENT-LVL III: ICD-10-PCS | Mod: PBBFAC,,, | Performed by: PHYSICIAN ASSISTANT

## 2022-03-31 PROCEDURE — 3008F BODY MASS INDEX DOCD: CPT | Mod: CPTII,S$GLB,, | Performed by: PHYSICIAN ASSISTANT

## 2022-03-31 PROCEDURE — 99999 PR PBB SHADOW E&M-EST. PATIENT-LVL III: CPT | Mod: PBBFAC,,, | Performed by: PHYSICIAN ASSISTANT

## 2022-03-31 PROCEDURE — 1160F PR REVIEW ALL MEDS BY PRESCRIBER/CLIN PHARMACIST DOCUMENTED: ICD-10-PCS | Mod: CPTII,S$GLB,, | Performed by: PHYSICIAN ASSISTANT

## 2022-03-31 PROCEDURE — 1159F PR MEDICATION LIST DOCUMENTED IN MEDICAL RECORD: ICD-10-PCS | Mod: CPTII,S$GLB,, | Performed by: PHYSICIAN ASSISTANT

## 2022-03-31 PROCEDURE — 3075F SYST BP GE 130 - 139MM HG: CPT | Mod: CPTII,S$GLB,, | Performed by: PHYSICIAN ASSISTANT

## 2022-03-31 PROCEDURE — 99213 OFFICE O/P EST LOW 20 MIN: CPT | Mod: 25,S$GLB,, | Performed by: PHYSICIAN ASSISTANT

## 2022-03-31 PROCEDURE — 1101F PR PT FALLS ASSESS DOC 0-1 FALLS W/OUT INJ PAST YR: ICD-10-PCS | Mod: CPTII,S$GLB,, | Performed by: PHYSICIAN ASSISTANT

## 2022-03-31 PROCEDURE — 1126F PR PAIN SEVERITY QUANTIFIED, NO PAIN PRESENT: ICD-10-PCS | Mod: CPTII,S$GLB,, | Performed by: PHYSICIAN ASSISTANT

## 2022-03-31 PROCEDURE — 1160F RVW MEDS BY RX/DR IN RCRD: CPT | Mod: CPTII,S$GLB,, | Performed by: PHYSICIAN ASSISTANT

## 2022-03-31 PROCEDURE — 3288F PR FALLS RISK ASSESSMENT DOCUMENTED: ICD-10-PCS | Mod: CPTII,S$GLB,, | Performed by: PHYSICIAN ASSISTANT

## 2022-03-31 PROCEDURE — 20526 THER INJECTION CARP TUNNEL: CPT | Mod: LT,S$GLB,, | Performed by: PHYSICIAN ASSISTANT

## 2022-03-31 PROCEDURE — 1101F PT FALLS ASSESS-DOCD LE1/YR: CPT | Mod: CPTII,S$GLB,, | Performed by: PHYSICIAN ASSISTANT

## 2022-03-31 PROCEDURE — 3079F PR MOST RECENT DIASTOLIC BLOOD PRESSURE 80-89 MM HG: ICD-10-PCS | Mod: CPTII,S$GLB,, | Performed by: PHYSICIAN ASSISTANT

## 2022-03-31 PROCEDURE — 20526 PR INJECT CARPAL TUNNEL: ICD-10-PCS | Mod: LT,S$GLB,, | Performed by: PHYSICIAN ASSISTANT

## 2022-03-31 PROCEDURE — 1159F MED LIST DOCD IN RCRD: CPT | Mod: CPTII,S$GLB,, | Performed by: PHYSICIAN ASSISTANT

## 2022-03-31 PROCEDURE — 3288F FALL RISK ASSESSMENT DOCD: CPT | Mod: CPTII,S$GLB,, | Performed by: PHYSICIAN ASSISTANT

## 2022-03-31 RX ORDER — DEXAMETHASONE SODIUM PHOSPHATE 4 MG/ML
4 INJECTION, SOLUTION INTRA-ARTICULAR; INTRALESIONAL; INTRAMUSCULAR; INTRAVENOUS; SOFT TISSUE ONCE
Status: COMPLETED | OUTPATIENT
Start: 2022-03-31 | End: 2022-03-31

## 2022-03-31 RX ADMIN — DEXAMETHASONE SODIUM PHOSPHATE 4 MG: 4 INJECTION, SOLUTION INTRA-ARTICULAR; INTRALESIONAL; INTRAMUSCULAR; INTRAVENOUS; SOFT TISSUE at 10:03

## 2022-03-31 NOTE — PROGRESS NOTES
Subjective:      Patient ID: Tammie Sanders is a 66 y.o. female.    Chief Complaint: Pain of the Left Wrist    Review of patient's allergies indicates:   Allergen Reactions    No known drug allergies         65 yo RHD F returns to clinic for follow up of left CTS.  She reports that sx are worsening over past few months.  Intermittent numbness and tingling to left thumb, index, middle fingers.  Sx worse at night and when she wakes up in the morning.  She is requesting CSI today, states that she had good relief of sx for a few months following last injection in November 2020.  She may be interested in left CTR in the future but would like to wait until after trawling season.    Pain  Pertinent negatives include no abdominal pain, change in bowel habit, chest pain, chills, congestion, coughing, diaphoresis, fever, joint swelling, neck pain or rash.       Review of Systems   Constitutional: Negative for chills, diaphoresis and fever.   HENT: Negative for congestion, ear discharge and ear pain.    Eyes: Negative for blurred vision, discharge, double vision and pain.   Cardiovascular: Negative for chest pain, claudication and cyanosis.   Respiratory: Negative for cough, hemoptysis and shortness of breath.    Endocrine: Negative for cold intolerance and heat intolerance.   Skin: Negative for color change, dry skin, itching and rash.   Musculoskeletal: Positive for joint pain and stiffness. Negative for arthritis, back pain, falls, gout, joint swelling, muscle weakness and neck pain.   Gastrointestinal: Negative for abdominal pain and change in bowel habit.   Neurological: Negative for brief paralysis, disturbances in coordination and dizziness.   Psychiatric/Behavioral: Negative for altered mental status and depression.         Objective:          General    Constitutional: She is oriented to person, place, and time. She appears well-developed and well-nourished. No distress.   HENT:   Head: Atraumatic.   Eyes: EOM are  normal. Right eye exhibits no discharge. Left eye exhibits no discharge.   Cardiovascular: Normal rate.    Pulmonary/Chest: Effort normal. No respiratory distress.   Abdominal: Soft.   Neurological: She is alert and oriented to person, place, and time.   Psychiatric: She has a normal mood and affect. Her behavior is normal.             Right Hand/Wrist Exam     Inspection   Scars: Wrist - absent Hand -  absent  Effusion: Wrist - absent Hand -  absent  Bruising: Wrist - absent Hand -  absent  Deformity: Wrist - deformity Hand -  deformity    Range of Motion     Wrist   Extension: normal   Flexion: normal   Pronation: normal   Supination: normal     Tests   Phalens Sign: negative  Tinel's sign (median nerve): negative  Finkelstein's test: negative  Carpal Tunnel Compression Test: negative  Cubital Tunnel Compression Test: negative      Other     Neuorologic Exam    Median Distribution: normal  Ulnar Distribution: normal  Radial Distribution: normal    Comments:  Generalized hand pain/stiffness  No swelling  No tenderness  Able to make full fist          Left Hand/Wrist Exam     Inspection   Scars: Wrist - absent Hand -  absent  Effusion: Wrist - absent Hand -  absent  Bruising: Wrist - absent Hand -  absent  Deformity: Wrist - absent Hand -  absent    Range of Motion     Wrist   Extension: normal   Flexion: normal   Pronation: normal   Supination: normal     Tests   Phalens sign: positive  Tinel's sign (median nerve): negative  Finkelstein's test: negative  Carpal Tunnel Compression Test: positive  Cubital Tunnel Compression Test: negative      Other     Sensory Exam  Median Distribution: normal  Ulnar Distribution: normal  Radial Distribution: normal    Comments:  Generalized hand pain/stiffness  No swelling  No tenderness  Able to make full fist      Right Elbow Exam     Tests   Tinel's sign (cubital tunnel): negative      Left Elbow Exam     Tests   Tinel's sign (cubital tunnel): negative        Muscle Strength    Right Upper Extremity   Wrist extension: 5/5   Wrist flexion: 5/5   : 5/5   Left Upper Extremity  Wrist extension: 5/5   Wrist flexion: 5/5   :  5/5     Vascular Exam       Capillary Refill  Right Hand: normal capillary refill  Left Hand: normal capillary refill                    Assessment:           Encounter Diagnosis   Name Primary?    Left carpal tunnel syndrome Yes    Left carpal tunnel syndrome  -     dexamethasone injection 4 mg               Plan:           I made the decision to obtain old records of the patient including previous notes and imaging. New imaging was ordered today of the extremity or extremities evaluated. I independently reviewed and interpreted the radiographs today as well as prior imaging.    The total face-to-face encounter time with this patient was 30 minutes and greater than 50% of of the encounter time was spent counseling the patient, coordinating care, and education regarding the pathology of his/her diagnosis. We have discussed a variety of treatment options including medications, bracing, nerve glide exercises, injections, occupational therapy and surgery. Pt is requesting left carpal tunnel injection and brace at this time.  Today, the patient chooses  a CSI and understands a minimum of 3 months time must lapse after injection, prior to a surgical procedure due to increased risk of infection.     1. PROCEDURE:  I have explained the risks, benefits, and alternatives of the procedure in detail.  The patient voices understanding and all questions have been answered.  The patient agrees to proceed as planned. The palmaris longus tendon was identified and marked and so was the distal wrist crease After I performed a sterile prep of the skin in the normal fashion the left carpal tunnel is injected from the volar approach using a 25 gauge needle with a combination of 1cc 1% plain lidocaine and 4 mg of dexamethasone.  The patient is cautioned and immediate relief of pain is  secondary to the local anesthetic and will be temporary.  After the anesthetic wears off there may be a increase in pain that may last for a few hours or a few days and they should use ice to help alleviate this flare up of pain. Patient tolerated the procedure well. The patient has been asked to report to us any redness, swelling, inflammation, or fevers. The patient has been asked to restrict the use of the left upper extremity for the next 24 hours.     2. Topical diclofenac as prescribed as needed.  3. HEP 91937 - I instructed and demonstrated a carpal tunnel nerve glide HEP. The patient then demonstrated understanding of exercises and proper technique. This program was performed for 10 minutes.   4. Wrist braces to be worn while sleeping and as needed during the day.  [id number] - Dilcia Rocha LPN, performed a custom orthotic / brace adjustment, fitting and training with the patient. The patient demonstrated understanding and proper care. This was performed for 10 minutes.  5. Ice compress to the affected area 2-3x a day for 15-20 minutes as needed for pain management.  6. RTC as needed for follow up.      Patient voices understanding of and agreement with treatment plan. All of the patient's questions were answered and the patient will contact us if she has any questions or concerns in the interim.

## 2022-04-27 ENCOUNTER — OFFICE VISIT (OUTPATIENT)
Dept: OBSTETRICS AND GYNECOLOGY | Facility: CLINIC | Age: 67
End: 2022-04-27
Payer: MEDICARE

## 2022-04-27 VITALS
RESPIRATION RATE: 16 BRPM | DIASTOLIC BLOOD PRESSURE: 78 MMHG | SYSTOLIC BLOOD PRESSURE: 124 MMHG | WEIGHT: 132.88 LBS | HEIGHT: 60 IN | HEART RATE: 65 BPM | BODY MASS INDEX: 26.09 KG/M2

## 2022-04-27 DIAGNOSIS — Z79.890 POSTMENOPAUSAL HRT (HORMONE REPLACEMENT THERAPY): Primary | ICD-10-CM

## 2022-04-27 DIAGNOSIS — N81.4 PELVIC RELAXATION DUE TO UTERINE PROLAPSE: ICD-10-CM

## 2022-04-27 PROCEDURE — 1160F PR REVIEW ALL MEDS BY PRESCRIBER/CLIN PHARMACIST DOCUMENTED: ICD-10-PCS | Mod: CPTII,S$GLB,, | Performed by: OBSTETRICS & GYNECOLOGY

## 2022-04-27 PROCEDURE — 3078F PR MOST RECENT DIASTOLIC BLOOD PRESSURE < 80 MM HG: ICD-10-PCS | Mod: CPTII,S$GLB,, | Performed by: OBSTETRICS & GYNECOLOGY

## 2022-04-27 PROCEDURE — 3288F FALL RISK ASSESSMENT DOCD: CPT | Mod: CPTII,S$GLB,, | Performed by: OBSTETRICS & GYNECOLOGY

## 2022-04-27 PROCEDURE — 99213 PR OFFICE/OUTPT VISIT, EST, LEVL III, 20-29 MIN: ICD-10-PCS | Mod: S$GLB,,, | Performed by: OBSTETRICS & GYNECOLOGY

## 2022-04-27 PROCEDURE — 1126F PR PAIN SEVERITY QUANTIFIED, NO PAIN PRESENT: ICD-10-PCS | Mod: CPTII,S$GLB,, | Performed by: OBSTETRICS & GYNECOLOGY

## 2022-04-27 PROCEDURE — 1160F RVW MEDS BY RX/DR IN RCRD: CPT | Mod: CPTII,S$GLB,, | Performed by: OBSTETRICS & GYNECOLOGY

## 2022-04-27 PROCEDURE — 1159F PR MEDICATION LIST DOCUMENTED IN MEDICAL RECORD: ICD-10-PCS | Mod: CPTII,S$GLB,, | Performed by: OBSTETRICS & GYNECOLOGY

## 2022-04-27 PROCEDURE — 3008F BODY MASS INDEX DOCD: CPT | Mod: CPTII,S$GLB,, | Performed by: OBSTETRICS & GYNECOLOGY

## 2022-04-27 PROCEDURE — 1101F PT FALLS ASSESS-DOCD LE1/YR: CPT | Mod: CPTII,S$GLB,, | Performed by: OBSTETRICS & GYNECOLOGY

## 2022-04-27 PROCEDURE — 1101F PR PT FALLS ASSESS DOC 0-1 FALLS W/OUT INJ PAST YR: ICD-10-PCS | Mod: CPTII,S$GLB,, | Performed by: OBSTETRICS & GYNECOLOGY

## 2022-04-27 PROCEDURE — 1126F AMNT PAIN NOTED NONE PRSNT: CPT | Mod: CPTII,S$GLB,, | Performed by: OBSTETRICS & GYNECOLOGY

## 2022-04-27 PROCEDURE — 99213 OFFICE O/P EST LOW 20 MIN: CPT | Mod: S$GLB,,, | Performed by: OBSTETRICS & GYNECOLOGY

## 2022-04-27 PROCEDURE — 3074F PR MOST RECENT SYSTOLIC BLOOD PRESSURE < 130 MM HG: ICD-10-PCS | Mod: CPTII,S$GLB,, | Performed by: OBSTETRICS & GYNECOLOGY

## 2022-04-27 PROCEDURE — 1159F MED LIST DOCD IN RCRD: CPT | Mod: CPTII,S$GLB,, | Performed by: OBSTETRICS & GYNECOLOGY

## 2022-04-27 PROCEDURE — 99999 PR PBB SHADOW E&M-EST. PATIENT-LVL III: CPT | Mod: PBBFAC,,, | Performed by: OBSTETRICS & GYNECOLOGY

## 2022-04-27 PROCEDURE — 99999 PR PBB SHADOW E&M-EST. PATIENT-LVL III: ICD-10-PCS | Mod: PBBFAC,,, | Performed by: OBSTETRICS & GYNECOLOGY

## 2022-04-27 PROCEDURE — 3008F PR BODY MASS INDEX (BMI) DOCUMENTED: ICD-10-PCS | Mod: CPTII,S$GLB,, | Performed by: OBSTETRICS & GYNECOLOGY

## 2022-04-27 PROCEDURE — 3288F PR FALLS RISK ASSESSMENT DOCUMENTED: ICD-10-PCS | Mod: CPTII,S$GLB,, | Performed by: OBSTETRICS & GYNECOLOGY

## 2022-04-27 PROCEDURE — 3078F DIAST BP <80 MM HG: CPT | Mod: CPTII,S$GLB,, | Performed by: OBSTETRICS & GYNECOLOGY

## 2022-04-27 PROCEDURE — 3074F SYST BP LT 130 MM HG: CPT | Mod: CPTII,S$GLB,, | Performed by: OBSTETRICS & GYNECOLOGY

## 2022-04-27 RX ORDER — MEDROXYPROGESTERONE ACETATE 2.5 MG/1
2.5 TABLET ORAL DAILY
Qty: 30 TABLET | Refills: 11 | Status: SHIPPED | OUTPATIENT
Start: 2022-04-27 | End: 2023-05-16

## 2022-04-27 RX ORDER — ESTRADIOL 1 MG/1
1 TABLET ORAL DAILY
Qty: 30 TABLET | Refills: 11 | Status: SHIPPED | OUTPATIENT
Start: 2022-04-27 | End: 2022-05-24

## 2022-04-27 RX ORDER — PREDNISOLONE ACETATE 10 MG/ML
SUSPENSION/ DROPS OPHTHALMIC
COMMUNITY
Start: 2022-04-25 | End: 2022-11-15

## 2022-04-27 NOTE — PROGRESS NOTES
Subjective:       Patient ID: Tammie Sanders is a 66 y.o. female.    Chief Complaint:  Follow-up (Pt states she took pessary out due to having more leakage with it. )      History of Present Illness  HPI  Pt her for follow up of pessary and HRT initiation.     She reports that she took the pessary out because she started having incontinence with the pessary in place.  She also reported some pressure with the pessary.    She is noticing some improvement with pelvic floor exercises and would like to continue with that at this time. Uncertain about PT at this time and is not interested in surgical mgmt for now.     Concerning HRT, she has noticed some improvement in hot flashes and other symptoms but not where she would like to be. Current medication is not covered by insurance, so she would like alternatives.     GYN & OB History  No LMP recorded. Patient is postmenopausal.   Date of Last Pap: No result found    OB History    Para Term  AB Living   3 3           SAB IAB Ectopic Multiple Live Births                  # Outcome Date GA Lbr Sherman/2nd Weight Sex Delivery Anes PTL Lv   3 Para            2 Para            1 Para                Review of Systems  Review of Systems   Constitutional: Negative for chills, diaphoresis, fatigue, fever and unexpected weight change.   HENT: Negative for congestion, hearing loss, rhinorrhea and sore throat.    Eyes: Negative for pain, discharge and visual disturbance.   Respiratory: Negative for apnea, cough, shortness of breath and wheezing.    Cardiovascular: Negative for chest pain, palpitations and leg swelling.   Gastrointestinal: Negative for abdominal pain, constipation, diarrhea, nausea and vomiting.   Endocrine: Positive for heat intolerance. Negative for cold intolerance.   Genitourinary: Positive for urgency (incontinence with pessary in place). Negative for difficulty urinating, dyspareunia, dysuria, flank pain, frequency, genital sores, hematuria, menstrual  problem, pelvic pain, vaginal bleeding, vaginal discharge and vaginal pain.   Musculoskeletal: Negative for arthralgias, back pain and joint swelling.   Skin: Negative for rash.   Neurological: Negative for dizziness, weakness, light-headedness, numbness and headaches.   Psychiatric/Behavioral: Negative for agitation and confusion. The patient is not nervous/anxious.            Objective:    Physical Exam:   Constitutional: She is oriented to person, place, and time. She appears well-developed and well-nourished. No distress.    HENT:   Head: Normocephalic and atraumatic.    Eyes: Conjunctivae and EOM are normal.      Pulmonary/Chest: Effort normal. No respiratory distress.                  Musculoskeletal: Normal range of motion.       Neurological: She is alert and oriented to person, place, and time.    Skin: Skin is warm and dry.    Psychiatric: She has a normal mood and affect. Her behavior is normal. Judgment and thought content normal.          Assessment:        1. Postmenopausal HRT (hormone replacement therapy)    2. Pelvic relaxation due to uterine prolapse              Plan:      Tammie was seen today for follow-up.    Diagnoses and all orders for this visit:    Postmenopausal HRT (hormone replacement therapy)  -     estradioL (ESTRACE) 1 MG tablet; Take 1 tablet (1 mg total) by mouth once daily.  -     medroxyPROGESTERone (PROVERA) 2.5 MG tablet; Take 1 tablet (2.5 mg total) by mouth once daily.    Pelvic relaxation due to uterine prolapse      Will continue home Kegel exercises at home.    Considering Pelvic floor pt.  Will reach out for referral if needing additional management.

## 2022-09-23 ENCOUNTER — TELEPHONE (OUTPATIENT)
Dept: OBSTETRICS AND GYNECOLOGY | Facility: CLINIC | Age: 67
End: 2022-09-23
Payer: MEDICARE

## 2022-09-23 NOTE — TELEPHONE ENCOUNTER
"Pt was called and she stated she has been having complaints of occasional dizziness, feeling anxious, mental fog, and "feeling fidgety". Pt stated she went to PCP to rule out UTI and has appt with neurology on 9/27/22. Pt wonders if the provera 2.5mg could be causing these symptoms. Pt desires to know if she can stop. Pt informed that since she has a uterus and is taking estrace, provera is need to protect uterus. Pt stated she is trying to rule out possible causes. Pt aware that she can stop both meds to see if symptoms improve or resolve, but hot flashes could return. Pt does not want to do anything until Dr. Mendoza gives her recommendations. Pt aware she is out of the office until Monday and desires to wait for her recommendations. Please advise. Thank you.  "

## 2022-09-23 NOTE — TELEPHONE ENCOUNTER
----- Message from Cha Tirado MA sent at 9/23/2022  9:55 AM CDT -----  Contact: SELF  Tammie Sanders  MRN: [id number]  Home Phone      [phone]  Work Phone      Not on file.  Mobile          [phone]    Patient Care Team:  Ignacia Chan MD as PCP - General (Family Medicine)  Dayan Duron MD as Obstetrician (Obstetrics)  OB? No  What phone number can you be reached at? [phone]  Message: Has questions regarding provera and estradiol medication she is currently taking at this time.

## 2022-09-26 NOTE — TELEPHONE ENCOUNTER
Please let Tammie know that she can try to stop her HRT to see if this is causing her symptoms.  She will need to stop the Estrace and the Provera as you discussed with her.    Agree with all of your counseling.

## 2022-09-26 NOTE — TELEPHONE ENCOUNTER
Pt was called and informed that she can stop both estrace and provera to see if symptoms improve. Pt desires to continue medication and see what Dr. Cruz says at her visit in two weeks.

## 2022-09-29 ENCOUNTER — OFFICE VISIT (OUTPATIENT)
Dept: NEUROLOGY | Facility: CLINIC | Age: 67
End: 2022-09-29
Payer: MEDICARE

## 2022-09-29 ENCOUNTER — LAB VISIT (OUTPATIENT)
Dept: LAB | Facility: HOSPITAL | Age: 67
End: 2022-09-29
Attending: NURSE PRACTITIONER
Payer: MEDICARE

## 2022-09-29 VITALS
HEART RATE: 79 BPM | BODY MASS INDEX: 25.11 KG/M2 | DIASTOLIC BLOOD PRESSURE: 80 MMHG | SYSTOLIC BLOOD PRESSURE: 128 MMHG | WEIGHT: 127.88 LBS | HEIGHT: 60 IN | RESPIRATION RATE: 12 BRPM

## 2022-09-29 DIAGNOSIS — G43.001 MIGRAINE WITHOUT AURA AND WITH STATUS MIGRAINOSUS, NOT INTRACTABLE: ICD-10-CM

## 2022-09-29 DIAGNOSIS — R41.3 MEMORY LOSS: ICD-10-CM

## 2022-09-29 DIAGNOSIS — R44.2 OLFACTORY HALLUCINATIONS: Primary | ICD-10-CM

## 2022-09-29 DIAGNOSIS — R44.2 OLFACTORY HALLUCINATIONS: ICD-10-CM

## 2022-09-29 DIAGNOSIS — R42 DIZZINESS: ICD-10-CM

## 2022-09-29 DIAGNOSIS — F41.1 GENERALIZED ANXIETY DISORDER: ICD-10-CM

## 2022-09-29 LAB
ALBUMIN SERPL BCP-MCNC: 4.4 G/DL (ref 3.5–5.2)
ALP SERPL-CCNC: 72 U/L (ref 55–135)
ALT SERPL W/O P-5'-P-CCNC: 17 U/L (ref 10–44)
ANION GAP SERPL CALC-SCNC: 10 MMOL/L (ref 8–16)
AST SERPL-CCNC: 16 U/L (ref 10–40)
BASOPHILS # BLD AUTO: 0.01 K/UL (ref 0–0.2)
BASOPHILS NFR BLD: 0.1 % (ref 0–1.9)
BILIRUB SERPL-MCNC: 0.6 MG/DL (ref 0.1–1)
BUN SERPL-MCNC: 11 MG/DL (ref 8–23)
CALCIUM SERPL-MCNC: 9.5 MG/DL (ref 8.7–10.5)
CHLORIDE SERPL-SCNC: 106 MMOL/L (ref 95–110)
CO2 SERPL-SCNC: 25 MMOL/L (ref 23–29)
CREAT SERPL-MCNC: 0.8 MG/DL (ref 0.5–1.4)
DIFFERENTIAL METHOD: NORMAL
EOSINOPHIL # BLD AUTO: 0.1 K/UL (ref 0–0.5)
EOSINOPHIL NFR BLD: 0.9 % (ref 0–8)
ERYTHROCYTE [DISTWIDTH] IN BLOOD BY AUTOMATED COUNT: 13.2 % (ref 11.5–14.5)
EST. GFR  (NO RACE VARIABLE): >60 ML/MIN/1.73 M^2
GLUCOSE SERPL-MCNC: 98 MG/DL (ref 70–110)
HCT VFR BLD AUTO: 44.4 % (ref 37–48.5)
HGB BLD-MCNC: 14.3 G/DL (ref 12–16)
IMM GRANULOCYTES # BLD AUTO: 0.02 K/UL (ref 0–0.04)
IMM GRANULOCYTES NFR BLD AUTO: 0.3 % (ref 0–0.5)
LYMPHOCYTES # BLD AUTO: 2 K/UL (ref 1–4.8)
LYMPHOCYTES NFR BLD: 30 % (ref 18–48)
MCH RBC QN AUTO: 28.3 PG (ref 27–31)
MCHC RBC AUTO-ENTMCNC: 32.2 G/DL (ref 32–36)
MCV RBC AUTO: 88 FL (ref 82–98)
MONOCYTES # BLD AUTO: 0.4 K/UL (ref 0.3–1)
MONOCYTES NFR BLD: 6.2 % (ref 4–15)
NEUTROPHILS # BLD AUTO: 4.2 K/UL (ref 1.8–7.7)
NEUTROPHILS NFR BLD: 62.5 % (ref 38–73)
NRBC BLD-RTO: 0 /100 WBC
PLATELET # BLD AUTO: 291 K/UL (ref 150–450)
PMV BLD AUTO: 10.2 FL (ref 9.2–12.9)
POTASSIUM SERPL-SCNC: 3.6 MMOL/L (ref 3.5–5.1)
PROT SERPL-MCNC: 7.5 G/DL (ref 6–8.4)
RBC # BLD AUTO: 5.06 M/UL (ref 4–5.4)
SODIUM SERPL-SCNC: 141 MMOL/L (ref 136–145)
T4 SERPL-MCNC: 7.3 UG/DL (ref 4.5–11.5)
TSH SERPL DL<=0.005 MIU/L-ACNC: 1.63 UIU/ML (ref 0.4–4)
VIT B12 SERPL-MCNC: 247 PG/ML (ref 210–950)
WBC # BLD AUTO: 6.79 K/UL (ref 3.9–12.7)

## 2022-09-29 PROCEDURE — 99214 PR OFFICE/OUTPT VISIT, EST, LEVL IV, 30-39 MIN: ICD-10-PCS | Mod: S$GLB,,, | Performed by: NURSE PRACTITIONER

## 2022-09-29 PROCEDURE — 80053 COMPREHEN METABOLIC PANEL: CPT | Performed by: NURSE PRACTITIONER

## 2022-09-29 PROCEDURE — 1159F MED LIST DOCD IN RCRD: CPT | Mod: CPTII,S$GLB,, | Performed by: NURSE PRACTITIONER

## 2022-09-29 PROCEDURE — 1126F AMNT PAIN NOTED NONE PRSNT: CPT | Mod: CPTII,S$GLB,, | Performed by: NURSE PRACTITIONER

## 2022-09-29 PROCEDURE — 82607 VITAMIN B-12: CPT | Performed by: NURSE PRACTITIONER

## 2022-09-29 PROCEDURE — 36415 COLL VENOUS BLD VENIPUNCTURE: CPT | Performed by: NURSE PRACTITIONER

## 2022-09-29 PROCEDURE — 3074F SYST BP LT 130 MM HG: CPT | Mod: CPTII,S$GLB,, | Performed by: NURSE PRACTITIONER

## 2022-09-29 PROCEDURE — 99214 OFFICE O/P EST MOD 30 MIN: CPT | Mod: S$GLB,,, | Performed by: NURSE PRACTITIONER

## 2022-09-29 PROCEDURE — 3079F PR MOST RECENT DIASTOLIC BLOOD PRESSURE 80-89 MM HG: ICD-10-PCS | Mod: CPTII,S$GLB,, | Performed by: NURSE PRACTITIONER

## 2022-09-29 PROCEDURE — 3008F PR BODY MASS INDEX (BMI) DOCUMENTED: ICD-10-PCS | Mod: CPTII,S$GLB,, | Performed by: NURSE PRACTITIONER

## 2022-09-29 PROCEDURE — 99999 PR PBB SHADOW E&M-EST. PATIENT-LVL III: CPT | Mod: PBBFAC,,, | Performed by: NURSE PRACTITIONER

## 2022-09-29 PROCEDURE — 99999 PR PBB SHADOW E&M-EST. PATIENT-LVL III: ICD-10-PCS | Mod: PBBFAC,,, | Performed by: NURSE PRACTITIONER

## 2022-09-29 PROCEDURE — 84443 ASSAY THYROID STIM HORMONE: CPT | Performed by: NURSE PRACTITIONER

## 2022-09-29 PROCEDURE — 85025 COMPLETE CBC W/AUTO DIFF WBC: CPT | Performed by: NURSE PRACTITIONER

## 2022-09-29 PROCEDURE — 1126F PR PAIN SEVERITY QUANTIFIED, NO PAIN PRESENT: ICD-10-PCS | Mod: CPTII,S$GLB,, | Performed by: NURSE PRACTITIONER

## 2022-09-29 PROCEDURE — 3074F PR MOST RECENT SYSTOLIC BLOOD PRESSURE < 130 MM HG: ICD-10-PCS | Mod: CPTII,S$GLB,, | Performed by: NURSE PRACTITIONER

## 2022-09-29 PROCEDURE — 3008F BODY MASS INDEX DOCD: CPT | Mod: CPTII,S$GLB,, | Performed by: NURSE PRACTITIONER

## 2022-09-29 PROCEDURE — 1159F PR MEDICATION LIST DOCUMENTED IN MEDICAL RECORD: ICD-10-PCS | Mod: CPTII,S$GLB,, | Performed by: NURSE PRACTITIONER

## 2022-09-29 PROCEDURE — 84436 ASSAY OF TOTAL THYROXINE: CPT | Performed by: NURSE PRACTITIONER

## 2022-09-29 PROCEDURE — 3079F DIAST BP 80-89 MM HG: CPT | Mod: CPTII,S$GLB,, | Performed by: NURSE PRACTITIONER

## 2022-09-29 RX ORDER — TRAZODONE HYDROCHLORIDE 50 MG/1
50 TABLET ORAL NIGHTLY PRN
COMMUNITY
Start: 2022-09-06

## 2022-09-29 RX ORDER — HYDROXYZINE HYDROCHLORIDE 25 MG/1
25 TABLET, FILM COATED ORAL DAILY PRN
Qty: 15 TABLET | Refills: 3 | Status: SHIPPED | OUTPATIENT
Start: 2022-09-29

## 2022-09-29 RX ORDER — SERTRALINE HYDROCHLORIDE 25 MG/1
25 TABLET, FILM COATED ORAL DAILY
Qty: 30 TABLET | Refills: 5 | Status: SHIPPED | OUTPATIENT
Start: 2022-09-29 | End: 2022-11-15 | Stop reason: SDUPTHER
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
## 2022-10-04 DIAGNOSIS — E53.8 B12 DEFICIENCY: Primary | ICD-10-CM

## 2022-10-04 RX ORDER — CYANOCOBALAMIN 1000 UG/ML
1000 INJECTION, SOLUTION INTRAMUSCULAR; SUBCUTANEOUS SEE ADMIN INSTRUCTIONS
Qty: 1 ML | Refills: 11 | Status: SHIPPED | OUTPATIENT
Start: 2022-10-04 | End: 2022-11-15 | Stop reason: SDUPTHER

## 2022-10-07 ENCOUNTER — TELEPHONE (OUTPATIENT)
Dept: NEUROLOGY | Facility: CLINIC | Age: 67
End: 2022-10-07
Payer: MEDICARE

## 2022-10-07 NOTE — TELEPHONE ENCOUNTER
----- Message from Lizzie Frazier sent at 10/7/2022  9:43 AM CDT -----  Contact: self  Tammie Sanders  MRN: [id number]  : 1955  PCP: Ignacia Chan  [place] Phone      [phone]  Work Phone      Not on file.  Mobile          [phone]      MESSAGE: Patient calling stating the pharmacy is waiting for someone to call them about her medication cyanocobalamin   Pharmacy [facility]    Phone   [phone]

## 2022-10-11 ENCOUNTER — HOSPITAL ENCOUNTER (OUTPATIENT)
Dept: RADIOLOGY | Facility: HOSPITAL | Age: 67
Discharge: HOME | End: 2022-10-11
Attending: NURSE PRACTITIONER
Payer: MEDICARE

## 2022-10-11 DIAGNOSIS — R41.3 MEMORY LOSS: ICD-10-CM

## 2022-10-11 DIAGNOSIS — R44.2 OLFACTORY HALLUCINATIONS: ICD-10-CM

## 2022-10-11 PROCEDURE — 70551 MRI BRAIN EPILEPSY WITHOUT CONTRAST: ICD-10-PCS | Mod: 26,,, | Performed by: RADIOLOGY

## 2022-10-11 PROCEDURE — 70551 MRI BRAIN STEM W/O DYE: CPT | Mod: TC

## 2022-10-11 PROCEDURE — 70551 MRI BRAIN STEM W/O DYE: CPT | Mod: 26,,, | Performed by: RADIOLOGY

## 2022-11-03 ENCOUNTER — HOSPITAL ENCOUNTER (OUTPATIENT)
Dept: NEUROLOGY | Facility: HOSPITAL | Age: 67
Discharge: HOME | End: 2022-11-03
Attending: NURSE PRACTITIONER
Payer: MEDICARE

## 2022-11-03 DIAGNOSIS — R41.3 MEMORY LOSS: ICD-10-CM

## 2022-11-03 DIAGNOSIS — R44.2 OLFACTORY HALLUCINATIONS: ICD-10-CM

## 2022-11-03 PROCEDURE — 95816 EEG AWAKE AND DROWSY: CPT

## 2022-11-08 ENCOUNTER — TELEPHONE (OUTPATIENT)
Dept: NEUROLOGY | Facility: CLINIC | Age: 67
End: 2022-11-08
Payer: MEDICARE

## 2022-11-15 ENCOUNTER — OFFICE VISIT (OUTPATIENT)
Dept: NEUROLOGY | Facility: CLINIC | Age: 67
End: 2022-11-15
Payer: MEDICARE

## 2022-11-15 VITALS
RESPIRATION RATE: 12 BRPM | HEART RATE: 71 BPM | DIASTOLIC BLOOD PRESSURE: 72 MMHG | BODY MASS INDEX: 25.95 KG/M2 | SYSTOLIC BLOOD PRESSURE: 130 MMHG | WEIGHT: 132.19 LBS | HEIGHT: 60 IN

## 2022-11-15 DIAGNOSIS — E53.8 B12 DEFICIENCY: ICD-10-CM

## 2022-11-15 DIAGNOSIS — F41.1 GENERALIZED ANXIETY DISORDER: ICD-10-CM

## 2022-11-15 DIAGNOSIS — G45.4 TRANSIENT GLOBAL AMNESIA: ICD-10-CM

## 2022-11-15 DIAGNOSIS — R41.3 MEMORY LOSS: ICD-10-CM

## 2022-11-15 DIAGNOSIS — R42 DIZZINESS: ICD-10-CM

## 2022-11-15 DIAGNOSIS — Q28.3 CONGENITAL ANOMALY OF CEREBROVASCULAR SYSTEM: ICD-10-CM

## 2022-11-15 DIAGNOSIS — G43.001 MIGRAINE WITHOUT AURA AND WITH STATUS MIGRAINOSUS, NOT INTRACTABLE: Primary | ICD-10-CM

## 2022-11-15 PROCEDURE — 1126F PR PAIN SEVERITY QUANTIFIED, NO PAIN PRESENT: ICD-10-PCS | Mod: CPTII,S$GLB,, | Performed by: NURSE PRACTITIONER

## 2022-11-15 PROCEDURE — 99999 PR PBB SHADOW E&M-EST. PATIENT-LVL III: CPT | Mod: PBBFAC,,, | Performed by: NURSE PRACTITIONER

## 2022-11-15 PROCEDURE — 3075F SYST BP GE 130 - 139MM HG: CPT | Mod: CPTII,S$GLB,, | Performed by: NURSE PRACTITIONER

## 2022-11-15 PROCEDURE — 3008F BODY MASS INDEX DOCD: CPT | Mod: CPTII,S$GLB,, | Performed by: NURSE PRACTITIONER

## 2022-11-15 PROCEDURE — 3078F DIAST BP <80 MM HG: CPT | Mod: CPTII,S$GLB,, | Performed by: NURSE PRACTITIONER

## 2022-11-15 PROCEDURE — 1126F AMNT PAIN NOTED NONE PRSNT: CPT | Mod: CPTII,S$GLB,, | Performed by: NURSE PRACTITIONER

## 2022-11-15 PROCEDURE — 3078F PR MOST RECENT DIASTOLIC BLOOD PRESSURE < 80 MM HG: ICD-10-PCS | Mod: CPTII,S$GLB,, | Performed by: NURSE PRACTITIONER

## 2022-11-15 PROCEDURE — 99214 PR OFFICE/OUTPT VISIT, EST, LEVL IV, 30-39 MIN: ICD-10-PCS | Mod: S$GLB,,, | Performed by: NURSE PRACTITIONER

## 2022-11-15 PROCEDURE — 3075F PR MOST RECENT SYSTOLIC BLOOD PRESS GE 130-139MM HG: ICD-10-PCS | Mod: CPTII,S$GLB,, | Performed by: NURSE PRACTITIONER

## 2022-11-15 PROCEDURE — 3008F PR BODY MASS INDEX (BMI) DOCUMENTED: ICD-10-PCS | Mod: CPTII,S$GLB,, | Performed by: NURSE PRACTITIONER

## 2022-11-15 PROCEDURE — 99214 OFFICE O/P EST MOD 30 MIN: CPT | Mod: S$GLB,,, | Performed by: NURSE PRACTITIONER

## 2022-11-15 PROCEDURE — 99999 PR PBB SHADOW E&M-EST. PATIENT-LVL III: ICD-10-PCS | Mod: PBBFAC,,, | Performed by: NURSE PRACTITIONER

## 2022-11-15 PROCEDURE — 1159F MED LIST DOCD IN RCRD: CPT | Mod: CPTII,S$GLB,, | Performed by: NURSE PRACTITIONER

## 2022-11-15 PROCEDURE — 1159F PR MEDICATION LIST DOCUMENTED IN MEDICAL RECORD: ICD-10-PCS | Mod: CPTII,S$GLB,, | Performed by: NURSE PRACTITIONER

## 2022-11-15 RX ORDER — SERTRALINE HYDROCHLORIDE 25 MG/1
25 TABLET, FILM COATED ORAL DAILY
Qty: 90 TABLET | Refills: 1 | Status: SHIPPED | OUTPATIENT
Start: 2022-11-15 | End: 2023-05-16

## 2022-11-15 RX ORDER — CYANOCOBALAMIN 1000 UG/ML
1000 INJECTION, SOLUTION INTRAMUSCULAR; SUBCUTANEOUS SEE ADMIN INSTRUCTIONS
Qty: 3 ML | Refills: 1 | Status: SHIPPED | OUTPATIENT
Start: 2022-11-15 | End: 2023-05-16

## 2022-11-15 NOTE — PROGRESS NOTES
"HPI: Tammie Sanders is a 66 y.o. female with common migraine with rebound headaches at times and nausea. 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 Improved headaches with Topamax, but some dysesthesia/ numbness in the left arm. EMG showed Mild bilateral Carpal tunnel syndrome  In 2017, an episode of transient global amnesia. Her workup for TIA was overall unrevealing.     She presents today for a follow up visit. She was evaluated for complaint of memory loss with lightheadedness when feeling overwhelmed at her last visit in 9/2022. MRI Brain done at her last visit, which was unrevealing for IC changes, but showed a stable venous angioma. EEG was completed, which was unremarkable for any cortical irritability.     B12 was found to be low, and B12 injections were initiated, which she has complied with.     A trial of Zoloft was started to treat for general anxiety, which has reduced her anxiety. She no longer feels overwhelmed, and she no longer feels "fidgety" unless she has a migraine, which isn't often.     She can have up to four headaches per month. She does not always require abortive therapy with Imitrex.     She is still not sleeping well at night unless she takes Benadryl nightly. She never started taking Trazodone consistently, after it was prescribed.     She is engaging in more self care.     Prn hydroxyzine was started for panic, but she has not yet tried.     She has had improvement to her memory with improvement to her anxiety.     Her mother has dementia. She takes care of her. Her  recently had some health issues also, but he is improved.     She no longer feels as if she is smelling food.     Review of Systems   Constitutional:  Negative for fever.   HENT:  Negative for nosebleeds.    Eyes:  Negative for double vision.   Respiratory:  Negative for wheezing.    Cardiovascular:  Negative for leg swelling. "   Gastrointestinal:  Negative for blood in stool.   Genitourinary:  Negative for hematuria.   Musculoskeletal:  Negative for falls.   Skin:  Negative for rash.   Neurological:  Positive for headaches.   Endo/Heme/Allergies:  Negative for polydipsia.   Psychiatric/Behavioral:  The patient has insomnia. The patient is not nervous/anxious.    Objective:      Physical Exam      Gen Appearance, well developed/nourished in no apparent distress  CV: 2+ distal pulses with no edema or swelling  Neuro:  MS: Awake, alert,  Sustains attention. Recent/remote memory intact, Language is full to spontaneous speech/comprehension. Fund of Knowledge is full  CN: Optic discs are flat with normal vasculature, PERRL, Extraoccular movements and visual fields are full. Normal facial strength, Tongue and Palate are midline and strong. Shoulder Shrug symmetric and strong.   Motor: Normal bulk, tone, no abnormal movements. 5/5 strength bilateral upper/lower extremities with 2+ reflexes  Sensory:  Romberg negative and sensation intact to light touch, vibration, and pain  Cerebellar: Finger-nose,  Rapid alternating movements intact  Gait: Normal stance, no ataxia    Imaging:  10/2022 MRI Brain:   COMPARISON:  01/23/2017     FINDINGS:  Intracranial Compartment:     Ventricles and sulci are normal in size for age without evidence of hydrocephalus. No extra-axial blood or fluid collections.     Stable focus of signal abnormality in the left aspect of the flor corresponding gradient susceptibility in this region, measuring approximately 6 mm.  This likely represents a small venous angioma or other vascular anomaly.  Few punctate foci of FLAIR signal abnormality in the supratentorial white matter in keeping with chronic microvascular ischemic disease of a mild degree.     Hippocampi have normal and symmetric architecture and signal.     Normal vascular flow voids are preserved.     Skull/Extracranial Contents (limited evaluation): Bone marrow signal  "intensity is normal.     Impression:     Stable focus of signal abnormality in the left flor likely related to a venous angioma or other vascular anomaly.     Mild generalized cerebral volume loss with mild chronic microvascular ischemic disease.  No evidence for an acute infarction.    9/2021 CTA Head:  Focal region of enhancement in the flor, unchanged from 2017 and likely related to a small vascular malformation/venous angioma.  No evidence for an acute intracranial hemorrhage, sulcal effacement, mass effect or midline shift.     No focal stenosis, occlusion or aneurysm involving the bilateral intracranial or extracranial carotid vasculature or vertebrobasilar system.     EEG:   normal 2017    CT C spine 10/2017:   At C5-6 there is spondylosis and bilateral neural foraminal stenosis, right greater than left, with unchanged from prior MR of February 11, 2014. Spinal canal stenosis is not seen.    CT head 10/2017:   Negative Head CT.    EMG 2017: Mild bilateral CTS    2022 EEG:  Normal    Labs:  9/2022 TSH, T4, CBC, CMP, B12 reviewed-B12 was 247    Assessment:   Tammie Sanders is a 66 y.o. female with common migraine with rebound headaches at times and nausea. 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 Improved headaches with Topamax, but some dysesthesia/ numbness in the left arm. EMG showed Mild bilateral Carpal tunnel syndrome. In 2017, an episode of transient global amnesia. Her workup for TIA was overall unrevealing. Repeat EMG in 2017 showed mild bilateral CTS.  Plan:   1. Memory improved with improved anxiety.    Regarding memory loss workup-MRI Brain per epilepsy protocol was unremarkable, aside from her stable venous angioma.   EEG unremarkable.   Labs showed a low B12 level. Continue injections monthly. Follow level over time. Recheck B12 level with next visit.     2. Continue Zoloft 25 " "mg to treat LISA, as this has been effective.   -Continue prn hydroxyzine for panic.   -To ER with threat of harm to self or others.     She may take Trazodone nightly for further control of her insomnia. Advised to stop Benadryl nightly, as this can contribute to cognitive slowing.     3. Botox did help but she had far too much out of pocket expense. Currently, her headaches are far better controlled. Verapamil did not help prior. She has failed Topamax and previously had not relief with Elavil and Propranolol.     -CGRP antagonist can be considered at any point if needed/ if headaches worsens.   -Imitrex to continue PRN and not currently needing Fioricet for rescue. Phenergan caused insomnia prior. Compazine can be used instead as prior for rescue/ nausea with headaches if needed.     4. CTS not fully responsive brace wearing. Ortho consult advised but states she will try home exercises first.     5. MRI C spine/CT head reassuring some mild changes in the C spine 2017, but no spinal stenosis.     6. No specific treatment needed per neurosurgery for " cavernous malformation vs. telangiectasia"    FU 6 months        "

## 2023-05-16 ENCOUNTER — OFFICE VISIT (OUTPATIENT)
Dept: NEUROLOGY | Facility: CLINIC | Age: 68
End: 2023-05-16
Payer: MEDICARE

## 2023-05-16 VITALS
OXYGEN SATURATION: 98 % | DIASTOLIC BLOOD PRESSURE: 64 MMHG | HEART RATE: 75 BPM | HEIGHT: 60 IN | RESPIRATION RATE: 14 BRPM | SYSTOLIC BLOOD PRESSURE: 118 MMHG | BODY MASS INDEX: 25.58 KG/M2 | WEIGHT: 130.31 LBS

## 2023-05-16 DIAGNOSIS — F41.1 GENERALIZED ANXIETY DISORDER: ICD-10-CM

## 2023-05-16 DIAGNOSIS — E53.8 B12 DEFICIENCY: ICD-10-CM

## 2023-05-16 DIAGNOSIS — R20.0 ARM NUMBNESS LEFT: ICD-10-CM

## 2023-05-16 DIAGNOSIS — Z76.0 MEDICATION REFILL: ICD-10-CM

## 2023-05-16 DIAGNOSIS — Q28.3 CONGENITAL ANOMALY OF CEREBROVASCULAR SYSTEM: ICD-10-CM

## 2023-05-16 DIAGNOSIS — R41.3 MEMORY LOSS: Primary | ICD-10-CM

## 2023-05-16 DIAGNOSIS — L29.9 ITCHING: ICD-10-CM

## 2023-05-16 DIAGNOSIS — G43.001 MIGRAINE WITHOUT AURA AND WITH STATUS MIGRAINOSUS, NOT INTRACTABLE: ICD-10-CM

## 2023-05-16 DIAGNOSIS — M54.2 NECK PAIN: ICD-10-CM

## 2023-05-16 PROCEDURE — 3074F SYST BP LT 130 MM HG: CPT | Mod: CPTII,,, | Performed by: NURSE PRACTITIONER

## 2023-05-16 PROCEDURE — 99999 PR PBB SHADOW E&M-EST. PATIENT-LVL IV: CPT | Mod: PBBFAC,,, | Performed by: NURSE PRACTITIONER

## 2023-05-16 PROCEDURE — 99999 PR PBB SHADOW E&M-EST. PATIENT-LVL IV: ICD-10-PCS | Mod: PBBFAC,,, | Performed by: NURSE PRACTITIONER

## 2023-05-16 PROCEDURE — 1126F PR PAIN SEVERITY QUANTIFIED, NO PAIN PRESENT: ICD-10-PCS | Mod: CPTII,,, | Performed by: NURSE PRACTITIONER

## 2023-05-16 PROCEDURE — 99214 PR OFFICE/OUTPT VISIT, EST, LEVL IV, 30-39 MIN: ICD-10-PCS | Mod: ,,, | Performed by: NURSE PRACTITIONER

## 2023-05-16 PROCEDURE — 3008F BODY MASS INDEX DOCD: CPT | Mod: CPTII,,, | Performed by: NURSE PRACTITIONER

## 2023-05-16 PROCEDURE — 1126F AMNT PAIN NOTED NONE PRSNT: CPT | Mod: CPTII,,, | Performed by: NURSE PRACTITIONER

## 2023-05-16 PROCEDURE — 99214 OFFICE O/P EST MOD 30 MIN: CPT | Mod: ,,, | Performed by: NURSE PRACTITIONER

## 2023-05-16 PROCEDURE — 3008F PR BODY MASS INDEX (BMI) DOCUMENTED: ICD-10-PCS | Mod: CPTII,,, | Performed by: NURSE PRACTITIONER

## 2023-05-16 PROCEDURE — 3078F DIAST BP <80 MM HG: CPT | Mod: CPTII,,, | Performed by: NURSE PRACTITIONER

## 2023-05-16 PROCEDURE — 1159F PR MEDICATION LIST DOCUMENTED IN MEDICAL RECORD: ICD-10-PCS | Mod: CPTII,,, | Performed by: NURSE PRACTITIONER

## 2023-05-16 PROCEDURE — 1160F PR REVIEW ALL MEDS BY PRESCRIBER/CLIN PHARMACIST DOCUMENTED: ICD-10-PCS | Mod: CPTII,,, | Performed by: NURSE PRACTITIONER

## 2023-05-16 PROCEDURE — 3074F PR MOST RECENT SYSTOLIC BLOOD PRESSURE < 130 MM HG: ICD-10-PCS | Mod: CPTII,,, | Performed by: NURSE PRACTITIONER

## 2023-05-16 PROCEDURE — 3078F PR MOST RECENT DIASTOLIC BLOOD PRESSURE < 80 MM HG: ICD-10-PCS | Mod: CPTII,,, | Performed by: NURSE PRACTITIONER

## 2023-05-16 PROCEDURE — 1160F RVW MEDS BY RX/DR IN RCRD: CPT | Mod: CPTII,,, | Performed by: NURSE PRACTITIONER

## 2023-05-16 PROCEDURE — 1159F MED LIST DOCD IN RCRD: CPT | Mod: CPTII,,, | Performed by: NURSE PRACTITIONER

## 2023-05-16 RX ORDER — SUMATRIPTAN SUCCINATE 100 MG/1
TABLET ORAL
Qty: 9 TABLET | Refills: 3 | Status: SHIPPED | OUTPATIENT
Start: 2023-05-16

## 2023-05-16 RX ORDER — CYANOCOBALAMIN 1000 UG/ML
INJECTION, SOLUTION INTRAMUSCULAR; SUBCUTANEOUS
Qty: 3 ML | Refills: 1 | Status: SHIPPED | OUTPATIENT
Start: 2023-05-16 | End: 2023-05-16 | Stop reason: SDUPTHER

## 2023-05-16 RX ORDER — SERTRALINE HYDROCHLORIDE 25 MG/1
TABLET, FILM COATED ORAL
Qty: 90 TABLET | Refills: 1 | Status: SHIPPED | OUTPATIENT
Start: 2023-05-16

## 2023-05-16 RX ORDER — CYANOCOBALAMIN 1000 UG/ML
1000 INJECTION, SOLUTION INTRAMUSCULAR; SUBCUTANEOUS
Qty: 3 ML | Refills: 1 | Status: SHIPPED | OUTPATIENT
Start: 2023-05-16

## 2023-05-16 NOTE — PROGRESS NOTES
HPI: Tammie Sanders is a 67 y.o. female with common migraine with rebound headaches at times and nausea. 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 Improved headaches with Topamax, but some dysesthesia/ numbness in the left arm. EMG showed Mild bilateral Carpal tunnel syndrome  In 2017, an episode of transient global amnesia. Her workup for TIA was overall unrevealing.     She presents today for a follow up visit. She has a brown patch to her right upper back. She began to experience periodic itching months ago, and over time she developed the brown patch. She does rub it at times, and this causes the itching to worsen. She saw a Dermatologist, who suggested that this could be related to age. She saw her PCP, who prescribed a cream, which was ineffective. She was then referred to see Dermatology.     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 The itching is tolerable. She declines the need to try a neuropathic type agent at this time.     She continues on B12 supplements. She recently had some labs done, including a B12 level.     No panic type complaints at this time.   Prn hydroxyzine was started for panic, but she has not yet tried. Anxiety overall well managed with Zoloft. She takes Trazodone prn for sleep.     Headaches improved currently. She does not recall the last time she needed to take Imitrex as abortive therapy.     She is no longer taking Benadryl at night.     She is engaging in more self care.     She has had improvement to her memory with improvement to her anxiety.     Her mother has dementia. She takes care of her. Her  recently had some health issues also, but he is improved.     She no longer feels as if she is smelling food.     Review of Systems   Constitutional:  Negative for fever.   HENT:  Negative for nosebleeds.    Eyes:  Negative for double vision.   Respiratory:  Negative for  wheezing.    Cardiovascular:  Negative for leg swelling.   Gastrointestinal:  Negative for blood in stool.   Genitourinary:  Negative for hematuria.   Musculoskeletal:  Negative for falls.   Skin:  Positive for itching. Negative for rash.   Neurological:  Positive for headaches.   Endo/Heme/Allergies:  Negative for polydipsia.   Psychiatric/Behavioral:  The patient has insomnia. The patient is not nervous/anxious.    Objective:      Physical Exam      Gen Appearance, well developed/nourished in no apparent distress  CV: 2+ distal pulses with no edema or swelling  Neuro:  MS: Awake, alert,  Sustains attention. Recent/remote memory intact, Language is full to spontaneous speech/comprehension. Fund of Knowledge is full  CN: PERRL, Extraoccular movements and visual fields are full. Normal facial strength, Tongue and Palate are midline and strong. Shoulder Shrug symmetric and strong.   Motor: Normal bulk, tone, no abnormal movements. 5/5 strength bilateral upper/lower extremities with 2+ reflexes  Sensory:  Romberg negative and sensation intact to light touch, vibration, and pain  Cerebellar: Finger-nose,  Rapid alternating movements intact  Gait: Normal stance, no ataxia    Imaging:  10/2022 MRI Brain:   COMPARISON:  01/23/2017     FINDINGS:  Intracranial Compartment:     Ventricles and sulci are normal in size for age without evidence of hydrocephalus. No extra-axial blood or fluid collections.     Stable focus of signal abnormality in the left aspect of the flor corresponding gradient susceptibility in this region, measuring approximately 6 mm.  This likely represents a small venous angioma or other vascular anomaly.  Few punctate foci of FLAIR signal abnormality in the supratentorial white matter in keeping with chronic microvascular ischemic disease of a mild degree.     Hippocampi have normal and symmetric architecture and signal.     Normal vascular flow voids are preserved.     Skull/Extracranial Contents  "(limited evaluation): Bone marrow signal intensity is normal.     Impression:     Stable focus of signal abnormality in the left flor likely related to a venous angioma or other vascular anomaly.     Mild generalized cerebral volume loss with mild chronic microvascular ischemic disease.  No evidence for an acute infarction.    9/2021 CTA Head:  Focal region of enhancement in the flor, unchanged from 2017 and likely related to a small vascular malformation/venous angioma.  No evidence for an acute intracranial hemorrhage, sulcal effacement, mass effect or midline shift.     No focal stenosis, occlusion or aneurysm involving the bilateral intracranial or extracranial carotid vasculature or vertebrobasilar system.     EEG:   normal 2017    CT C spine 10/2017:   At C5-6 there is spondylosis and bilateral neural foraminal stenosis, right greater than left, with unchanged from prior MR of February 11, 2014. Spinal canal stenosis is not seen.    CT head 10/2017:   Negative Head CT.    EMG 2017: Mild bilateral CTS    2022 EEG:  Normal    Labs:  9/2022 TSH, T4, CBC, CMP, B12 reviewed-B12 was 247  5/2023 B12 395  Assessment:   Tammie Sanders is a 67 y.o. female with common migraine with rebound headaches at times and nausea. 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 Improved headaches with Topamax, but some dysesthesia/ numbness in the left arm. EMG showed Mild bilateral Carpal tunnel syndrome. In 2017, an episode of transient global amnesia. Her workup for TIA was overall unrevealing. Repeat EMG in 2017 showed mild bilateral CTS.  Plan:   1. Memory improved with improved anxiety.  Regarding memory loss workup-MRI Brain per epilepsy protocol was unremarkable, aside from her stable venous angioma.   EEG unremarkable.     Labs showed a low B12 level. Continue injections monthly. Follow level over time.     2. " "Continue Zoloft 25 mg to treat LISA, as this has been effective.   -Continue prn hydroxyzine for panic.   -To ER with threat of harm to self or others.     She may take Trazodone nightly for further control of her insomnia. Previously advised to stop Benadryl nightly, as this can contribute to cognitive slowing.     3. Botox did help but she had far too much out of pocket expense. Currently, her headaches are far better controlled. Verapamil did not help prior. She has failed Topamax and previously had not relief with Elavil and Propranolol.     -CGRP antagonist can be considered at any point if needed/ if headaches worsens.     -Imitrex to continue PRN and not currently needing Fioricet for rescue. Phenergan caused insomnia prior. Compazine can be used instead as prior for rescue/ nausea with headaches if needed.     4. CTS not fully responsive brace wearing. Ortho consult advised but states she will try home exercises first.     5. MRI C spine/CT head reassuring some mild changes in the C spine 2017, but no spinal stenosis.     6. No specific treatment needed per neurosurgery for " cavernous malformation vs. telangiectasia"    7. She has a brown patch to the right T-spine, which is suggestive of notalgia paresthetica. Offered neuropathic agent for itching, but this is tolerable currently. She may try Lidocaine topically over the counter. Notify me if she desires treatment.   -she has seen Dermatology, who advised that her skin lesion could be related to age.   -she saw PCP and failed an unknown cream for this.     FU 6 months          "

## (undated) DEVICE — PAD CAST SPECIALIST STRL 3

## (undated) DEVICE — ELECTRODE NEEDLE 2.8IN

## (undated) DEVICE — UNDERGLOVES BIOGEL PI SIZE 8

## (undated) DEVICE — BANDAGE ELASTIC 3X5 VELCRO ST

## (undated) DEVICE — DRAPE STERI U-SHAPED 47X51IN

## (undated) DEVICE — DRAPE EXTREMITY FULL FABRIC

## (undated) DEVICE — APPLICATOR CHLORAPREP ORN 26ML

## (undated) DEVICE — SHEET DRAPE FAN-FOLDED 3/4

## (undated) DEVICE — NDL ECLIPSE SAFETY 18GX1-1/2IN

## (undated) DEVICE — DRESSING XEROFORM 1X8IN

## (undated) DEVICE — NDL SAFETY 25G X 1.5 ECLIPSE

## (undated) DEVICE — SUT 4.0 ETHILON

## (undated) DEVICE — SYR 10CC LUER LOCK

## (undated) DEVICE — GAUZE SPONGE 4X4 12PLY

## (undated) DEVICE — SEE MEDLINE ITEM 152522

## (undated) DEVICE — PACK GENERAL SURGERY

## (undated) DEVICE — GLOVE BIOGEL ECLIPSE SZ 7.5